# Patient Record
Sex: MALE | Race: WHITE | Employment: OTHER | URBAN - METROPOLITAN AREA
[De-identification: names, ages, dates, MRNs, and addresses within clinical notes are randomized per-mention and may not be internally consistent; named-entity substitution may affect disease eponyms.]

---

## 2019-06-17 ENCOUNTER — HOSPITAL ENCOUNTER (OUTPATIENT)
Dept: GENERAL RADIOLOGY | Age: 54
Discharge: HOME | End: 2019-06-17
Payer: COMMERCIAL

## 2019-06-17 DIAGNOSIS — M25.552 LEFT HIP PAIN: ICD-10-CM

## 2019-06-17 DIAGNOSIS — M25.551 RIGHT HIP PAIN: ICD-10-CM

## 2019-06-17 PROCEDURE — 73521 X-RAY EXAM HIPS BI 2 VIEWS: CPT

## 2020-07-14 ENCOUNTER — HOSPITAL ENCOUNTER (OUTPATIENT)
Dept: CT IMAGING | Age: 55
Discharge: HOME | End: 2020-07-14
Attending: INTERNAL MEDICINE
Payer: COMMERCIAL

## 2020-07-14 DIAGNOSIS — R14.0 ABDOMINAL BLOATING: ICD-10-CM

## 2020-07-14 PROCEDURE — 74177 CT ABD & PELVIS W/CONTRAST: CPT

## 2020-07-14 PROCEDURE — 74011636320 HC RX REV CODE- 636/320: Performed by: STUDENT IN AN ORGANIZED HEALTH CARE EDUCATION/TRAINING PROGRAM

## 2020-07-14 RX ADMIN — IOPAMIDOL 100 ML: 755 INJECTION, SOLUTION INTRAVENOUS at 17:46

## 2020-07-20 ENCOUNTER — HOSPITAL ENCOUNTER (EMERGENCY)
Age: 55
Discharge: HOME | End: 2020-07-20
Attending: EMERGENCY MEDICINE | Admitting: EMERGENCY MEDICINE
Payer: COMMERCIAL

## 2020-07-20 ENCOUNTER — APPOINTMENT (OUTPATIENT)
Dept: GENERAL RADIOLOGY | Age: 55
End: 2020-07-20
Attending: EMERGENCY MEDICINE
Payer: COMMERCIAL

## 2020-07-20 ENCOUNTER — APPOINTMENT (OUTPATIENT)
Dept: ULTRASOUND IMAGING | Age: 55
End: 2020-07-20
Attending: EMERGENCY MEDICINE
Payer: COMMERCIAL

## 2020-07-20 VITALS
HEART RATE: 64 BPM | OXYGEN SATURATION: 95 % | BODY MASS INDEX: 29.68 KG/M2 | HEIGHT: 71 IN | RESPIRATION RATE: 18 BRPM | DIASTOLIC BLOOD PRESSURE: 80 MMHG | SYSTOLIC BLOOD PRESSURE: 134 MMHG | TEMPERATURE: 98.6 F | WEIGHT: 212 LBS

## 2020-07-20 DIAGNOSIS — R06.02 SOB (SHORTNESS OF BREATH): ICD-10-CM

## 2020-07-20 DIAGNOSIS — K76.0 HEPATIC STEATOSIS: Primary | ICD-10-CM

## 2020-07-20 DIAGNOSIS — K80.20 CALCULUS OF GALLBLADDER WITHOUT CHOLECYSTITIS WITHOUT OBSTRUCTION: ICD-10-CM

## 2020-07-20 LAB
ALBUMIN SERPL-MCNC: 3.6 G/DL (ref 3.5–5)
ALBUMIN/GLOB SERPL: 1.1 {RATIO} (ref 1.1–2.2)
ALP SERPL-CCNC: 78 U/L (ref 45–117)
ALT SERPL-CCNC: 48 U/L (ref 12–78)
ANION GAP SERPL CALC-SCNC: 8 MMOL/L (ref 5–15)
AST SERPL-CCNC: 25 U/L (ref 15–37)
BASOPHILS # BLD: 0.1 K/UL (ref 0–0.1)
BASOPHILS NFR BLD: 1 % (ref 0–1)
BILIRUB SERPL-MCNC: 0.6 MG/DL (ref 0.2–1)
BUN SERPL-MCNC: 9 MG/DL (ref 6–20)
BUN/CREAT SERPL: 8 (ref 12–20)
CALCIUM SERPL-MCNC: 8.4 MG/DL (ref 8.5–10.1)
CHLORIDE SERPL-SCNC: 96 MMOL/L (ref 97–108)
CO2 SERPL-SCNC: 27 MMOL/L (ref 21–32)
COMMENT, HOLDF: NORMAL
CREAT SERPL-MCNC: 1.07 MG/DL (ref 0.7–1.3)
D DIMER PPP FEU-MCNC: 0.3 MG/L FEU (ref 0–0.65)
DIFFERENTIAL METHOD BLD: NORMAL
EOSINOPHIL # BLD: 0 K/UL (ref 0–0.4)
EOSINOPHIL NFR BLD: 0 % (ref 0–7)
ERYTHROCYTE [DISTWIDTH] IN BLOOD BY AUTOMATED COUNT: 13.9 % (ref 11.5–14.5)
GLOBULIN SER CALC-MCNC: 3.3 G/DL (ref 2–4)
GLUCOSE SERPL-MCNC: 102 MG/DL (ref 65–100)
HCT VFR BLD AUTO: 38.4 % (ref 36.6–50.3)
HGB BLD-MCNC: 13.2 G/DL (ref 12.1–17)
IMM GRANULOCYTES # BLD AUTO: 0 K/UL (ref 0–0.04)
IMM GRANULOCYTES NFR BLD AUTO: 0 % (ref 0–0.5)
LYMPHOCYTES # BLD: 2.1 K/UL (ref 0.8–3.5)
LYMPHOCYTES NFR BLD: 21 % (ref 12–49)
MCH RBC QN AUTO: 30.8 PG (ref 26–34)
MCHC RBC AUTO-ENTMCNC: 34.4 G/DL (ref 30–36.5)
MCV RBC AUTO: 89.5 FL (ref 80–99)
MONOCYTES # BLD: 0.6 K/UL (ref 0–1)
MONOCYTES NFR BLD: 6 % (ref 5–13)
NEUTS SEG # BLD: 6.9 K/UL (ref 1.8–8)
NEUTS SEG NFR BLD: 72 % (ref 32–75)
NRBC # BLD: 0 K/UL (ref 0–0.01)
NRBC BLD-RTO: 0 PER 100 WBC
PLATELET # BLD AUTO: 240 K/UL (ref 150–400)
PMV BLD AUTO: 9.7 FL (ref 8.9–12.9)
POTASSIUM SERPL-SCNC: 4.1 MMOL/L (ref 3.5–5.1)
PROT SERPL-MCNC: 6.9 G/DL (ref 6.4–8.2)
RBC # BLD AUTO: 4.29 M/UL (ref 4.1–5.7)
SAMPLES BEING HELD,HOLD: NORMAL
SODIUM SERPL-SCNC: 131 MMOL/L (ref 136–145)
TROPONIN I SERPL-MCNC: <0.05 NG/ML
WBC # BLD AUTO: 9.7 K/UL (ref 4.1–11.1)

## 2020-07-20 PROCEDURE — 93005 ELECTROCARDIOGRAM TRACING: CPT

## 2020-07-20 PROCEDURE — 76700 US EXAM ABDOM COMPLETE: CPT

## 2020-07-20 PROCEDURE — 74011250637 HC RX REV CODE- 250/637: Performed by: EMERGENCY MEDICINE

## 2020-07-20 PROCEDURE — 85025 COMPLETE CBC W/AUTO DIFF WBC: CPT

## 2020-07-20 PROCEDURE — 71046 X-RAY EXAM CHEST 2 VIEWS: CPT

## 2020-07-20 PROCEDURE — 74011000250 HC RX REV CODE- 250: Performed by: EMERGENCY MEDICINE

## 2020-07-20 PROCEDURE — 99284 EMERGENCY DEPT VISIT MOD MDM: CPT

## 2020-07-20 PROCEDURE — 85379 FIBRIN DEGRADATION QUANT: CPT

## 2020-07-20 PROCEDURE — 84484 ASSAY OF TROPONIN QUANT: CPT

## 2020-07-20 PROCEDURE — 80053 COMPREHEN METABOLIC PANEL: CPT

## 2020-07-20 RX ADMIN — LIDOCAINE HYDROCHLORIDE 40 ML: 20 SOLUTION ORAL; TOPICAL at 20:28

## 2020-07-20 NOTE — ED TRIAGE NOTES
Pt arrives via POV w/ c/c of CP & abdominal pain x5 weeks. Pt's abdomen appears distended & pt stated that it is causing him to be SOB.  Pt has hx of PE.

## 2020-07-20 NOTE — ED PROVIDER NOTES
Patient is a 15-year-old male with history of anxiety, depression and presenting to emergency department for evaluation of abdominal pain, chest tightness, shortness of breath with onset 5 weeks ago. Patient was seen by his primary care physician 2 weeks ago, and has had negative outpatient abdominal ultrasound and abdominal CTs, CT scan was last week. Pain is described as intermittent, with no known exacerbation or alleviation factors. He denies dizziness, fever, cough, nausea, vomiting, diarrhea, constipation, leg swelling, or any other medical at this time. Last bowel movement was this morning and was nonbloody and normal per patient. He reports history of pulmonary embolism of unknown cause in 2016, he is currently taking Eliquis.            Past Medical History:   Diagnosis Date    Anxiety disorder     Depression     Neuropathy of foot     Psychiatric disorder     panic disorder       Past Surgical History:   Procedure Laterality Date    HX OTHER SURGICAL      fatty tumor removed from shoulder blade    HX OTHER SURGICAL  2014    left arm surgery following accident    HX TUMOR REMOVAL      fatty tumor removed from Left shoulder x 2         Family History:   Problem Relation Age of Onset    Heart Attack Father        Social History     Socioeconomic History    Marital status:      Spouse name: Not on file    Number of children: Not on file    Years of education: Not on file    Highest education level: Not on file   Occupational History    Not on file   Social Needs    Financial resource strain: Not on file    Food insecurity     Worry: Not on file     Inability: Not on file    Transportation needs     Medical: Not on file     Non-medical: Not on file   Tobacco Use    Smoking status: Never Smoker    Smokeless tobacco: Current User     Types: Chew   Substance and Sexual Activity    Alcohol use: No     Alcohol/week: 0.0 standard drinks    Drug use: No    Sexual activity: Yes Partners: Female   Lifestyle    Physical activity     Days per week: Not on file     Minutes per session: Not on file    Stress: Not on file   Relationships    Social connections     Talks on phone: Not on file     Gets together: Not on file     Attends Restoration service: Not on file     Active member of club or organization: Not on file     Attends meetings of clubs or organizations: Not on file     Relationship status: Not on file    Intimate partner violence     Fear of current or ex partner: Not on file     Emotionally abused: Not on file     Physically abused: Not on file     Forced sexual activity: Not on file   Other Topics Concern    Not on file   Social History Narrative    ** Merged History Encounter **        48 year old   male admitted for complaints of severely depressed mood, with suicidal thoughts. Pt. Lives with his wife of 12 years and works as a  for [employer]. Pt.has some college. 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 He has been treated for the last 25 years on Xanax by his psychiatrist, Dr. Ventura Villalobos. Dione Burr. He has one prior inpatient admission at [facility], in the last year. Pt. Has been having breakthrough anxiety and \"panic attacks\" despite Xanax doses greater than 3 mg q day and the addition of propanolol. Pt. Hs not been treated with an SSRI. ALLERGIES: Sulfa (sulfonamide antibiotics)    Review of Systems   Constitutional: Negative for chills and fever. HENT: Negative for ear pain and sore throat. Eyes: Negative for visual disturbance. Respiratory: Positive for shortness of breath. Negative for cough. Cardiovascular: Positive for chest pain. Negative for palpitations and leg swelling. Gastrointestinal: Positive for abdominal pain. Negative for blood in stool, constipation, diarrhea, nausea and vomiting. Genitourinary: Negative for dysuria, flank pain and hematuria. Musculoskeletal: Negative for back pain.    Skin: Negative for color change. Neurological: Negative for dizziness and headaches. Psychiatric/Behavioral: Negative for confusion. All other systems reviewed and are negative. Vitals:    07/20/20 1910 07/20/20 1914   BP: 135/69    Pulse: 62    Resp: 25    Temp: 98.7 °F (37.1 °C)    SpO2: 98% 98%   Weight: 96.2 kg (212 lb)    Height: 5' 11\" (1.803 m)             Physical Exam  Vitals signs and nursing note reviewed. Constitutional:       General: He is not in acute distress. Appearance: Normal appearance. He is not ill-appearing. HENT:      Head: Normocephalic and atraumatic. Mouth/Throat:      Pharynx: Oropharynx is clear. Eyes:      Extraocular Movements: Extraocular movements intact. Conjunctiva/sclera: Conjunctivae normal.   Neck:      Musculoskeletal: No neck rigidity. Cardiovascular:      Rate and Rhythm: Normal rate and regular rhythm. Heart sounds: Normal heart sounds. Pulmonary:      Effort: Pulmonary effort is normal.      Breath sounds: Normal breath sounds. No decreased breath sounds, wheezing, rhonchi or rales. Chest:      Chest wall: No tenderness or edema. Abdominal:      Palpations: Abdomen is soft. Tenderness: There is generalized abdominal tenderness. There is no right CVA tenderness, left CVA tenderness or guarding. Negative signs include Taylor's sign, McBurney's sign and psoas sign. Musculoskeletal: Normal range of motion. Right lower leg: He exhibits no tenderness. No edema. Left lower leg: He exhibits no tenderness. No edema. Skin:     General: Skin is warm and dry. Neurological:      General: No focal deficit present. Mental Status: He is alert and oriented to person, place, and time. Psychiatric:         Mood and Affect: Mood normal.          MDM  Number of Diagnoses or Management Options  Diagnosis management comments: Patient is alert, well-appearing, afebrile, vital stable.   5 with history of ongoing abdominal pain, has had negative outpatient ultrasounds and a negative outpatient abdominal CT on July 14. Pain has been unchanged since imaging. He has been seen by his primary care physician for this. Abdomen seems mildly distended, is soft, with generalized tenderness without guarding in all 4 quadrants. Negative Taylor sign, negative McBurney's point. He also complains of chest pressure and intermittent shortness of breath, lungs clear to auscultation bilaterally. He had a pulmonary embolism several years ago and is currently taking Eliquis. Plan: EKG, CBC, CMP, troponin, d-dimer, ultrasound abdomen, chest x-ray, monitor, reassess       Amount and/or Complexity of Data Reviewed  Clinical lab tests: reviewed  Tests in the radiology section of CPT®: reviewed  Tests in the medicine section of CPT®: reviewed  Discuss the patient with other providers: yes (Dr. Tina Lindsay )      ED Course as of Jul 20 2047   Mon Jul 20, 2020 2008 ED EKG interpretation:8:09 PM  Rhythm: Sinus bradycardia and regular. Rate (approx.): 59; Axis: normal; P wave: normal; ST/T wave: no concerning ST elevations or depressions; Other findings: unremarkable. Leopoldo Deans, PA        [EH]   2035 TROPONIN I:    Troponin-I, Qt. <0.05 []   2035 CBC WITH AUTOMATED DIFF:    WBC 9.7   RBC 4.29   HGB 13.2   HCT 38.4   MCV 89.5   MCH 30.8   MCHC 34.4   RDW 13.9   PLATELET 940   MPV 9.7   NRBC 0.0   ABSOLUTE NRBC 0.00   NEUTROPHILS 72   LYMPHOCYTES 21   MONOCYTES 6   EOSINOPHILS 0   BASOPHILS 1   IMMATURE GRANULOCYTES 0   ABS. NEUTROPHILS 6.9   ABS. LYMPHOCYTES 2.1   ABS. MONOCYTES 0.6   ABS. EOSINOPHILS 0.0   ABS. BASOPHILS 0.1   ABS. IMM. GRANS. 0.0   DF AUTOMATED [EH]   7636 METABOLIC PANEL, COMPREHENSIVE(!):    Sodium 131(!)   Potassium 4.1   Chloride 96(!)   CO2 27   Anion gap 8   Glucose 102(!)   BUN 9   Creatinine 1.07   BUN/Creatinine ratio 8(!)   GFR est AA >60   GFR est non-AA >60   Calcium 8.4(!)   Bilirubin, total 0.6   ALT 48   AST 25   Alk. phosphatase 78   Protein, total 6.9   Albumin 3.6   Globulin 3.3   A-G Ratio 1.1 []   2035 IMPRESSION:   1. Vague heterogeneous opacities scattered in both lungs, suspicious for  multifocal pneumonia. XR CHEST PA AND LATERAL []      ED Course User Index  [EH] NAE Mota     9:17 PM  Chest x-ray with concerning features for possible mild viral pneumonia. I personally ambulated patient without signs of desaturation. Discussed with patient chest x-ray findings and he notes that he had a fever and cough several months ago, but denies any current symptoms. Will not move forward with COVID-19 test at this time as he does not show any signs of symptomatic infection. EKG without acute ischemic changes, troponin x1-, heart score low risk category. D-dimer within normal limits. Abdominal ultrasound shows hepatic steatosis, splenomegaly, and gallstones without evidence of cholecystitis. Remainder of ED work-up within normal limits. Pt has been reevaluated. There are no new complaints, changes, or physical findings at this time. Medications have been reviewed w/ pt and/or family. Pt and/or family's questions have been answered. Pt and/or family expressed good understanding of the dx/tx/rx and is in agreement with plan of care. Pt instructed and agreed to f/u w/ PCP and to return to ED upon further deterioration. Pt is ready for discharge. Discussed patient with attending ED MD Angelina Lopez MD who agrees with current management plan. IMPRESSION:  1. Hepatic steatosis    2. SOB (shortness of breath)    3. Calculus of gallbladder without cholecystitis without obstruction        PLAN:  1. Current Discharge Medication List        2.    Follow-up Information    None           Return to ED if worse           Procedures

## 2020-07-20 NOTE — ED NOTES
Addressed patient using AIDET. Patient updated on plan of care, and given opportunity to ask questions. Patient verbalizes understanding. Patient is currently resting, no needs expressed at this time. Bed is in the low position, wheels are locked, and call bell is within reach. Will continue to monitor and assess.

## 2020-07-21 LAB
ATRIAL RATE: 59 BPM
CALCULATED P AXIS, ECG09: 17 DEGREES
CALCULATED R AXIS, ECG10: -4 DEGREES
CALCULATED T AXIS, ECG11: 46 DEGREES
DIAGNOSIS, 93000: NORMAL
P-R INTERVAL, ECG05: 196 MS
Q-T INTERVAL, ECG07: 426 MS
QRS DURATION, ECG06: 84 MS
QTC CALCULATION (BEZET), ECG08: 421 MS
VENTRICULAR RATE, ECG03: 59 BPM

## 2020-07-28 ENCOUNTER — HOSPITAL ENCOUNTER (EMERGENCY)
Age: 55
Discharge: HOME | End: 2020-07-28
Attending: EMERGENCY MEDICINE
Payer: COMMERCIAL

## 2020-07-28 ENCOUNTER — APPOINTMENT (OUTPATIENT)
Dept: GENERAL RADIOLOGY | Age: 55
End: 2020-07-28
Attending: EMERGENCY MEDICINE
Payer: COMMERCIAL

## 2020-07-28 VITALS
SYSTOLIC BLOOD PRESSURE: 149 MMHG | BODY MASS INDEX: 29.68 KG/M2 | HEART RATE: 59 BPM | RESPIRATION RATE: 18 BRPM | TEMPERATURE: 97.9 F | OXYGEN SATURATION: 97 % | WEIGHT: 212 LBS | HEIGHT: 71 IN | DIASTOLIC BLOOD PRESSURE: 92 MMHG

## 2020-07-28 DIAGNOSIS — R10.84 ABDOMINAL PAIN, GENERALIZED: Primary | ICD-10-CM

## 2020-07-28 LAB
ALBUMIN SERPL-MCNC: 3.6 G/DL (ref 3.5–5)
ALBUMIN/GLOB SERPL: 1 {RATIO} (ref 1.1–2.2)
ALP SERPL-CCNC: 80 U/L (ref 45–117)
ALT SERPL-CCNC: 48 U/L (ref 12–78)
ANION GAP SERPL CALC-SCNC: 7 MMOL/L (ref 5–15)
AST SERPL-CCNC: 27 U/L (ref 15–37)
BASOPHILS # BLD: 0.1 K/UL (ref 0–0.1)
BASOPHILS NFR BLD: 1 % (ref 0–1)
BILIRUB SERPL-MCNC: 0.4 MG/DL (ref 0.2–1)
BUN SERPL-MCNC: 6 MG/DL (ref 6–20)
BUN/CREAT SERPL: 6 (ref 12–20)
CALCIUM SERPL-MCNC: 8.4 MG/DL (ref 8.5–10.1)
CHLORIDE SERPL-SCNC: 101 MMOL/L (ref 97–108)
CO2 SERPL-SCNC: 25 MMOL/L (ref 21–32)
COMMENT, HOLDF: NORMAL
CREAT SERPL-MCNC: 1.09 MG/DL (ref 0.7–1.3)
D DIMER PPP FEU-MCNC: 0.36 MG/L FEU (ref 0–0.65)
DIFFERENTIAL METHOD BLD: NORMAL
EOSINOPHIL # BLD: 0.1 K/UL (ref 0–0.4)
EOSINOPHIL NFR BLD: 1 % (ref 0–7)
ERYTHROCYTE [DISTWIDTH] IN BLOOD BY AUTOMATED COUNT: 13.9 % (ref 11.5–14.5)
GLOBULIN SER CALC-MCNC: 3.6 G/DL (ref 2–4)
GLUCOSE SERPL-MCNC: 107 MG/DL (ref 65–100)
HCT VFR BLD AUTO: 39.5 % (ref 36.6–50.3)
HGB BLD-MCNC: 13.8 G/DL (ref 12.1–17)
IMM GRANULOCYTES # BLD AUTO: 0 K/UL (ref 0–0.04)
IMM GRANULOCYTES NFR BLD AUTO: 0 % (ref 0–0.5)
LIPASE SERPL-CCNC: 151 U/L (ref 73–393)
LYMPHOCYTES # BLD: 2.4 K/UL (ref 0.8–3.5)
LYMPHOCYTES NFR BLD: 25 % (ref 12–49)
MCH RBC QN AUTO: 31.3 PG (ref 26–34)
MCHC RBC AUTO-ENTMCNC: 34.9 G/DL (ref 30–36.5)
MCV RBC AUTO: 89.6 FL (ref 80–99)
MONOCYTES # BLD: 0.6 K/UL (ref 0–1)
MONOCYTES NFR BLD: 6 % (ref 5–13)
NEUTS SEG # BLD: 6.7 K/UL (ref 1.8–8)
NEUTS SEG NFR BLD: 67 % (ref 32–75)
NRBC # BLD: 0 K/UL (ref 0–0.01)
NRBC BLD-RTO: 0 PER 100 WBC
PLATELET # BLD AUTO: 234 K/UL (ref 150–400)
PMV BLD AUTO: 9.6 FL (ref 8.9–12.9)
POTASSIUM SERPL-SCNC: 3.9 MMOL/L (ref 3.5–5.1)
PROT SERPL-MCNC: 7.2 G/DL (ref 6.4–8.2)
RBC # BLD AUTO: 4.41 M/UL (ref 4.1–5.7)
SAMPLES BEING HELD,HOLD: NORMAL
SODIUM SERPL-SCNC: 133 MMOL/L (ref 136–145)
TROPONIN I SERPL-MCNC: <0.05 NG/ML
WBC # BLD AUTO: 9.9 K/UL (ref 4.1–11.1)

## 2020-07-28 PROCEDURE — 93005 ELECTROCARDIOGRAM TRACING: CPT

## 2020-07-28 PROCEDURE — 99283 EMERGENCY DEPT VISIT LOW MDM: CPT

## 2020-07-28 PROCEDURE — 84484 ASSAY OF TROPONIN QUANT: CPT

## 2020-07-28 PROCEDURE — 85379 FIBRIN DEGRADATION QUANT: CPT

## 2020-07-28 PROCEDURE — 80053 COMPREHEN METABOLIC PANEL: CPT

## 2020-07-28 PROCEDURE — 83690 ASSAY OF LIPASE: CPT

## 2020-07-28 PROCEDURE — 85025 COMPLETE CBC W/AUTO DIFF WBC: CPT

## 2020-07-28 PROCEDURE — 71046 X-RAY EXAM CHEST 2 VIEWS: CPT

## 2020-07-28 PROCEDURE — 36415 COLL VENOUS BLD VENIPUNCTURE: CPT

## 2020-07-28 NOTE — ED PROVIDER NOTES
Tito Portillo is a 60-year-old male presenting today with generalized abdominal pain. Starts in the epigastric/right upper quadrant then spreads across his entire abdomen. Has had this chronically for the past couple weeks, with intermittent flareups. Today's flareup started this morning, has currently resolved. Does not have any abdominal pain currently. Followed by both primary care physician and GI for this, last CT on July 14, 2020 showed no  acute processes. Last abdominal ultrasound on July 20, 2020 showed gallstones. GI has seen and evaluated patient, placed him on dicyclomine. Patient has a history of PE's, previous PE had similar pain. No leg swelling, recent trauma or surgery, cough, hemoptysis, shortness of breath. Patient is currently on Eliquis. Past medical history significant for anxiety, depression, neuropathy, panic disorder.            Past Medical History:   Diagnosis Date    Anxiety disorder     Depression     Neuropathy of foot     Psychiatric disorder     panic disorder       Past Surgical History:   Procedure Laterality Date    HX OTHER SURGICAL      fatty tumor removed from shoulder blade    HX OTHER SURGICAL  2014    left arm surgery following accident    HX TUMOR REMOVAL      fatty tumor removed from Left shoulder x 2         Family History:   Problem Relation Age of Onset    Heart Attack Father        Social History     Socioeconomic History    Marital status:      Spouse name: Not on file    Number of children: Not on file    Years of education: Not on file    Highest education level: Not on file   Occupational History    Not on file   Social Needs    Financial resource strain: Not on file    Food insecurity     Worry: Not on file     Inability: Not on file    Transportation needs     Medical: Not on file     Non-medical: Not on file   Tobacco Use    Smoking status: Never Smoker    Smokeless tobacco: Current User     Types: Chew   Substance and Sexual Activity    Alcohol use: No     Alcohol/week: 0.0 standard drinks    Drug use: No    Sexual activity: Yes     Partners: Female   Lifestyle    Physical activity     Days per week: Not on file     Minutes per session: Not on file    Stress: Not on file   Relationships    Social connections     Talks on phone: Not on file     Gets together: Not on file     Attends Denominational service: Not on file     Active member of club or organization: Not on file     Attends meetings of clubs or organizations: Not on file     Relationship status: Not on file    Intimate partner violence     Fear of current or ex partner: Not on file     Emotionally abused: Not on file     Physically abused: Not on file     Forced sexual activity: Not on file   Other Topics Concern    Not on file   Social History Narrative    ** Merged History Encounter **        48 year old   male admitted for complaints of severely depressed mood, with suicidal thoughts. Pt. Lives with his wife of 12 years and works as a  for [employer]. Pt.has some college. 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 He has been treated for the last 25 years on Xanax by his psychiatrist, Dr. Drake Harris. Rashaun Santizo. He has one prior inpatient admission at [facility], in the last year. Pt. Has been having breakthrough anxiety and \"panic attacks\" despite Xanax doses greater than 3 mg q day and the addition of propanolol. Pt. Hs not been treated with an SSRI. ALLERGIES: Sulfa (sulfonamide antibiotics)    Review of Systems   Constitutional: Negative for chills and fever. HENT: Negative for congestion and sore throat. Eyes: Negative for pain. Respiratory: Negative for cough and shortness of breath. Cardiovascular: Negative for chest pain and palpitations. Gastrointestinal: Negative for abdominal pain, diarrhea, nausea and vomiting. Genitourinary: Negative for dysuria, frequency and urgency.    Musculoskeletal: Negative for back pain and neck pain. Skin: Negative for rash and wound. Neurological: Negative for dizziness, light-headedness and headaches. Vitals:    07/28/20 1655   BP: (!) 157/98   Pulse: 70   Resp: 20   Temp: 99.4 °F (37.4 °C)   SpO2: 96%   Weight: 96.2 kg (212 lb)   Height: 5' 11\" (1.803 m)            Physical Exam  Vitals signs and nursing note reviewed. Constitutional:       Appearance: Normal appearance. HENT:      Head: Normocephalic and atraumatic. Nose: Nose normal.   Eyes:      Extraocular Movements: Extraocular movements intact. Conjunctiva/sclera: Conjunctivae normal.      Pupils: Pupils are equal, round, and reactive to light. Neck:      Musculoskeletal: Normal range of motion and neck supple. Cardiovascular:      Rate and Rhythm: Normal rate and regular rhythm. Pulses: Normal pulses. Radial pulses are 2+ on the right side and 2+ on the left side. Posterior tibial pulses are 2+ on the right side and 2+ on the left side. Heart sounds: Normal heart sounds. Pulmonary:      Effort: Pulmonary effort is normal.      Breath sounds: Normal breath sounds. Abdominal:      General: Abdomen is flat. Bowel sounds are normal.      Palpations: Abdomen is soft. Tenderness: There is no abdominal tenderness. There is no right CVA tenderness, left CVA tenderness, guarding or rebound. Negative signs include Taylor's sign. Musculoskeletal: Normal range of motion. Skin:     General: Skin is warm and dry. Neurological:      General: No focal deficit present. Mental Status: He is alert and oriented to person, place, and time. Mental status is at baseline. Psychiatric:         Mood and Affect: Mood normal.         Behavior: Behavior normal.         Thought Content:  Thought content normal.          MDM  Number of Diagnoses or Management Options  Abdominal pain, generalized:   Diagnosis management comments: LABS:  Recent Results (from the past 6 hour(s))  -CBC WITH AUTOMATED DIFF  Collection Time: 07/28/20  5:29 PM       Result                      Value             Ref Range           WBC                         9.9               4.1 - 11.1 K*       RBC                         4.41              4.10 - 5.70 *       HGB                         13.8              12.1 - 17.0 *       HCT                         39.5              36.6 - 50.3 %       MCV                         89.6              80.0 - 99.0 *       MCH                         31.3              26.0 - 34.0 *       MCHC                        34.9              30.0 - 36.5 *       RDW                         13.9              11.5 - 14.5 %       PLATELET                    234               150 - 400 K/*       MPV                         9.6               8.9 - 12.9 FL       NRBC                        0.0               0  WBC       ABSOLUTE NRBC               0.00              0.00 - 0.01 *       NEUTROPHILS                 67                32 - 75 %           LYMPHOCYTES                 25                12 - 49 %           MONOCYTES                   6                 5 - 13 %            EOSINOPHILS                 1                 0 - 7 %             BASOPHILS                   1                 0 - 1 %             IMMATURE GRANULOCYTES       0                 0.0 - 0.5 %         ABS. NEUTROPHILS            6.7               1.8 - 8.0 K/*       ABS. LYMPHOCYTES            2.4               0.8 - 3.5 K/*       ABS. MONOCYTES              0.6               0.0 - 1.0 K/*       ABS. EOSINOPHILS            0.1               0.0 - 0.4 K/*       ABS. BASOPHILS              0.1               0.0 - 0.1 K/*       ABS. IMM.  GRANS.            0.0               0.00 - 0.04 *       DF                          AUTOMATED                        -METABOLIC PANEL, COMPREHENSIVE  Collection Time: 07/28/20  5:29 PM       Result                      Value             Ref Range           Sodium                      133 (L) 136 - 145 mm*       Potassium                   3.9               3.5 - 5.1 mm*       Chloride                    101               97 - 108 mmo*       CO2                         25                21 - 32 mmol*       Anion gap                   7                 5 - 15 mmol/L       Glucose                     107 (H)           65 - 100 mg/*       BUN                         6                 6 - 20 MG/DL        Creatinine                  1.09              0.70 - 1.30 *       BUN/Creatinine ratio        6 (L)             12 - 20             GFR est AA                  >60               >60 ml/min/1*       GFR est non-AA              >60               >60 ml/min/1*       Calcium                     8.4 (L)           8.5 - 10.1 M*       Bilirubin, total            0.4               0.2 - 1.0 MG*       ALT (SGPT)                  48                12 - 78 U/L         AST (SGOT)                  27                15 - 37 U/L         Alk.  phosphatase            80                45 - 117 U/L        Protein, total              7.2               6.4 - 8.2 g/*       Albumin                     3.6               3.5 - 5.0 g/*       Globulin                    3.6               2.0 - 4.0 g/*       A-G Ratio                   1.0 (L)           1.1 - 2.2      -LIPASE  Collection Time: 07/28/20  5:29 PM       Result                      Value             Ref Range           Lipase                      151               73 - 393 U/L   -TROPONIN I  Collection Time: 07/28/20  5:29 PM       Result                      Value             Ref Range           Troponin-I, Qt.             <0.05             <0.05 ng/mL    -D DIMER  Collection Time: 07/28/20  5:29 PM       Result                      Value             Ref Range           D-dimer                     0.36              0.00 - 0.65 *  -SAMPLES BEING HELD  Collection Time: 07/28/20  5:29 PM       Result                      Value             Ref Range           SAMPLES BEING HELD          SST. RED                               COMMENT                                                       Add-on orders for these samples will be processed based on acceptable specimen integrity and analyte stability, which may vary by analyte. IMAGING:  XR CHEST PA LAT   Final Result    IMPRESSION:    1. No radiographic evidence of acute cardiopulmonary disease. Medications During Visit:  Medications - No data to display      DECISION MAKING:  Alie Leonard is a 48 y.o. male who comes in as above. Acute on chronic exacerbation of right upper quadrant/epigastric abdominal pain that radiates across the entire abdomen. Followed by both GI and PCP for this with no clear etiology. Placed on dicyclomine by GI. Last CT completed on July 14, 2020, last ultrasound completed on July 20, 2020. Patient states this is the same exact pain as previous episodes. Vitals stable. Patient resting comfortably and without acute distress, abdominal exam benign, no tenderness, no rebound, no guarding. Discussed would not repeat CT/ultrasound due to radiation/no change in pain/no tenderness today. Patient agreeable. Follow-up with GI and PCP within 1 week. Return to ED if any worsening or severe symptoms.       IMPRESSION:  Abdominal pain, generalized  (primary encounter diagnosis)    DISPOSITION:  Discharged      Current Discharge Medication List         Follow-up Information     Follow up With Specialties Details Why Contact Info    Antoni Sanchez MD Family Medicine Schedule an appointment as soon as   possible for a visit in 1 week  [address] [facility]      Oskar Tran MD Family Medicine, Gastroenterology Schedule an   appointment as soon as possible for a visit in 1 week  [address]  [address] [facility] [address]  [phone]      [address] [place] [address] DEPT Emergency Medicine Go to  If symptoms worsen [address] [facility] [id number] [phone]          The patient is asked to follow-up with their primary care provider in the next several days. They are to call tomorrow for an appointment. The patient is asked to return promptly for any increased concerns or worsening of symptoms. They can return to this emergency department or any other emergency department.            Procedures

## 2020-07-28 NOTE — ED TRIAGE NOTES
Pt reports generalized abdominal pain onset about 5-6 weeks ago radiating to his back. Also reports pleuritic chest pain. Pt states he has been seen in the ED and by his PCP for his sx with no diagnosis and his sx have not improved. Pt reports hx of PE with similar pain. Denies any pain or swelling in his legs. Takes Eliquis. Denies nausea, vomiting, or diarrhea.

## 2020-07-28 NOTE — ED NOTES

## 2020-07-29 LAB
ATRIAL RATE: 69 BPM
CALCULATED P AXIS, ECG09: 29 DEGREES
CALCULATED R AXIS, ECG10: 8 DEGREES
CALCULATED T AXIS, ECG11: 50 DEGREES
DIAGNOSIS, 93000: NORMAL
P-R INTERVAL, ECG05: 192 MS
Q-T INTERVAL, ECG07: 398 MS
QRS DURATION, ECG06: 90 MS
QTC CALCULATION (BEZET), ECG08: 426 MS
VENTRICULAR RATE, ECG03: 69 BPM

## 2020-08-02 ENCOUNTER — APPOINTMENT (OUTPATIENT)
Dept: CT IMAGING | Age: 55
End: 2020-08-02
Attending: EMERGENCY MEDICINE
Payer: COMMERCIAL

## 2020-08-02 ENCOUNTER — HOSPITAL ENCOUNTER (EMERGENCY)
Age: 55
Discharge: HOME | End: 2020-08-02
Attending: EMERGENCY MEDICINE
Payer: COMMERCIAL

## 2020-08-02 VITALS
HEIGHT: 71 IN | HEART RATE: 76 BPM | BODY MASS INDEX: 30.1 KG/M2 | TEMPERATURE: 98.4 F | OXYGEN SATURATION: 94 % | SYSTOLIC BLOOD PRESSURE: 139 MMHG | DIASTOLIC BLOOD PRESSURE: 61 MMHG | RESPIRATION RATE: 18 BRPM | WEIGHT: 215 LBS

## 2020-08-02 DIAGNOSIS — R10.84 ABDOMINAL PAIN, GENERALIZED: Primary | ICD-10-CM

## 2020-08-02 LAB
ALBUMIN SERPL-MCNC: 3.6 G/DL (ref 3.5–5)
ALBUMIN/GLOB SERPL: 1 {RATIO} (ref 1.1–2.2)
ALP SERPL-CCNC: 86 U/L (ref 45–117)
ALT SERPL-CCNC: 46 U/L (ref 12–78)
ANION GAP SERPL CALC-SCNC: 5 MMOL/L (ref 5–15)
AST SERPL-CCNC: 28 U/L (ref 15–37)
BASOPHILS # BLD: 0.1 K/UL (ref 0–0.1)
BASOPHILS NFR BLD: 1 % (ref 0–1)
BILIRUB SERPL-MCNC: 0.5 MG/DL (ref 0.2–1)
BUN SERPL-MCNC: 9 MG/DL (ref 6–20)
BUN/CREAT SERPL: 8 (ref 12–20)
CALCIUM SERPL-MCNC: 8.5 MG/DL (ref 8.5–10.1)
CHLORIDE SERPL-SCNC: 101 MMOL/L (ref 97–108)
CO2 SERPL-SCNC: 30 MMOL/L (ref 21–32)
COMMENT, HOLDF: NORMAL
CREAT SERPL-MCNC: 1.19 MG/DL (ref 0.7–1.3)
DIFFERENTIAL METHOD BLD: NORMAL
EOSINOPHIL # BLD: 0.1 K/UL (ref 0–0.4)
EOSINOPHIL NFR BLD: 1 % (ref 0–7)
ERYTHROCYTE [DISTWIDTH] IN BLOOD BY AUTOMATED COUNT: 14 % (ref 11.5–14.5)
GLOBULIN SER CALC-MCNC: 3.5 G/DL (ref 2–4)
GLUCOSE SERPL-MCNC: 129 MG/DL (ref 65–100)
HCT VFR BLD AUTO: 41.1 % (ref 36.6–50.3)
HGB BLD-MCNC: 13.7 G/DL (ref 12.1–17)
IMM GRANULOCYTES # BLD AUTO: 0 K/UL (ref 0–0.04)
IMM GRANULOCYTES NFR BLD AUTO: 0 % (ref 0–0.5)
LACTATE BLD-SCNC: 1.76 MMOL/L (ref 0.4–2)
LIPASE SERPL-CCNC: 178 U/L (ref 73–393)
LYMPHOCYTES # BLD: 2.7 K/UL (ref 0.8–3.5)
LYMPHOCYTES NFR BLD: 28 % (ref 12–49)
MCH RBC QN AUTO: 30.5 PG (ref 26–34)
MCHC RBC AUTO-ENTMCNC: 33.3 G/DL (ref 30–36.5)
MCV RBC AUTO: 91.5 FL (ref 80–99)
MONOCYTES # BLD: 0.7 K/UL (ref 0–1)
MONOCYTES NFR BLD: 7 % (ref 5–13)
NEUTS SEG # BLD: 5.9 K/UL (ref 1.8–8)
NEUTS SEG NFR BLD: 63 % (ref 32–75)
NRBC # BLD: 0 K/UL (ref 0–0.01)
NRBC BLD-RTO: 0 PER 100 WBC
PLATELET # BLD AUTO: 234 K/UL (ref 150–400)
PMV BLD AUTO: 9.7 FL (ref 8.9–12.9)
POTASSIUM SERPL-SCNC: 3.8 MMOL/L (ref 3.5–5.1)
PROT SERPL-MCNC: 7.1 G/DL (ref 6.4–8.2)
RBC # BLD AUTO: 4.49 M/UL (ref 4.1–5.7)
RBC MORPH BLD: NORMAL
SAMPLES BEING HELD,HOLD: NORMAL
SODIUM SERPL-SCNC: 136 MMOL/L (ref 136–145)
WBC # BLD AUTO: 9.5 K/UL (ref 4.1–11.1)

## 2020-08-02 PROCEDURE — 74011250636 HC RX REV CODE- 250/636: Performed by: EMERGENCY MEDICINE

## 2020-08-02 PROCEDURE — 74176 CT ABD & PELVIS W/O CONTRAST: CPT

## 2020-08-02 PROCEDURE — 99284 EMERGENCY DEPT VISIT MOD MDM: CPT

## 2020-08-02 PROCEDURE — 83605 ASSAY OF LACTIC ACID: CPT

## 2020-08-02 PROCEDURE — 85025 COMPLETE CBC W/AUTO DIFF WBC: CPT

## 2020-08-02 PROCEDURE — 96374 THER/PROPH/DIAG INJ IV PUSH: CPT

## 2020-08-02 PROCEDURE — 96375 TX/PRO/DX INJ NEW DRUG ADDON: CPT

## 2020-08-02 PROCEDURE — 36415 COLL VENOUS BLD VENIPUNCTURE: CPT

## 2020-08-02 PROCEDURE — 83690 ASSAY OF LIPASE: CPT

## 2020-08-02 PROCEDURE — 80053 COMPREHEN METABOLIC PANEL: CPT

## 2020-08-02 RX ORDER — MORPHINE SULFATE 4 MG/ML
4 INJECTION INTRAVENOUS
Status: COMPLETED | OUTPATIENT
Start: 2020-08-02 | End: 2020-08-02

## 2020-08-02 RX ORDER — OXYCODONE AND ACETAMINOPHEN 5; 325 MG/1; MG/1
1 TABLET ORAL
Qty: 12 TAB | Refills: 0 | Status: SHIPPED | OUTPATIENT
Start: 2020-08-02 | End: 2020-08-05

## 2020-08-02 RX ORDER — SUCRALFATE 1 G/10ML
1 SUSPENSION ORAL 4 TIMES DAILY
Qty: 200 ML | Refills: 0 | Status: SHIPPED | OUTPATIENT
Start: 2020-08-02 | End: 2020-08-07

## 2020-08-02 RX ORDER — ACETAMINOPHEN 500 MG
1000 TABLET ORAL 3 TIMES DAILY
Qty: 24 TAB | Refills: 0 | Status: SHIPPED | OUTPATIENT
Start: 2020-08-02 | End: 2020-08-02 | Stop reason: CLARIF

## 2020-08-02 RX ORDER — ALUMINA, MAGNESIA, AND SIMETHICONE 2400; 2400; 240 MG/30ML; MG/30ML; MG/30ML
10 SUSPENSION ORAL
Qty: 150 ML | Refills: 0 | Status: SHIPPED | OUTPATIENT
Start: 2020-08-02 | End: 2020-08-02 | Stop reason: CLARIF

## 2020-08-02 RX ORDER — HYDROMORPHONE HYDROCHLORIDE 1 MG/ML
1 INJECTION, SOLUTION INTRAMUSCULAR; INTRAVENOUS; SUBCUTANEOUS ONCE
Status: COMPLETED | OUTPATIENT
Start: 2020-08-02 | End: 2020-08-02

## 2020-08-02 RX ORDER — ONDANSETRON 2 MG/ML
4 INJECTION INTRAMUSCULAR; INTRAVENOUS
Status: DISCONTINUED | OUTPATIENT
Start: 2020-08-02 | End: 2020-08-02 | Stop reason: HOSPADM

## 2020-08-02 RX ADMIN — SODIUM CHLORIDE 1000 ML: 900 INJECTION, SOLUTION INTRAVENOUS at 03:20

## 2020-08-02 RX ADMIN — HYDROMORPHONE HYDROCHLORIDE 1 MG: 1 INJECTION, SOLUTION INTRAMUSCULAR; INTRAVENOUS; SUBCUTANEOUS at 04:38

## 2020-08-02 RX ADMIN — MORPHINE SULFATE 4 MG: 4 INJECTION INTRAVENOUS at 03:19

## 2020-08-02 RX ADMIN — ONDANSETRON 4 MG: 2 INJECTION INTRAMUSCULAR; INTRAVENOUS at 03:19

## 2020-08-02 NOTE — ED TRIAGE NOTES
Abdominal pain starting \"months ago\", worsening tonight. Was seen here Tuesday night. Has not been able to work since Tuesday. Guarding in triage, pacing around room.

## 2020-08-02 NOTE — ED PROVIDER NOTES
64-year-old male presenting ER with complaint of abdominal pain. Patient has been seen in the ER several times in the last week for the same symptoms. Patient reports associated nausea but no vomiting. Patient had CAT scan abdomen pelvis with IV contrast on July 8 with no acute findings, ultrasound July 20 showing fatty liver and gallstones with no signs of cholecystitis patient was also seen at [facility] facility within the week and had a CT of the chest.  Patient has history of PEs and currently on anticoagulation Eliquis. Patient's Eliquis was changed from Xarelto. Patient reports speaking with his GI doctor who started him on Bentyl however he is not taking this medication. Patient also takes Protonix. Patient denies any constipation or diarrhea. No dysuria. Abdominal Pain    Pertinent negatives include no fever, no diarrhea, no nausea, no vomiting, no dysuria, no headaches, no chest pain and no back pain.         Past Medical History:   Diagnosis Date    Anxiety disorder     Depression     Neuropathy of foot     Psychiatric disorder     panic disorder       Past Surgical History:   Procedure Laterality Date    HX OTHER SURGICAL      fatty tumor removed from shoulder blade    HX OTHER SURGICAL  2014    left arm surgery following accident    HX TUMOR REMOVAL      fatty tumor removed from Left shoulder x 2         Family History:   Problem Relation Age of Onset    Heart Attack Father        Social History     Socioeconomic History    Marital status:      Spouse name: Not on file    Number of children: Not on file    Years of education: Not on file    Highest education level: Not on file   Occupational History    Not on file   Social Needs    Financial resource strain: Not on file    Food insecurity     Worry: Not on file     Inability: Not on file    Transportation needs     Medical: Not on file     Non-medical: Not on file   Tobacco Use    Smoking status: Never Smoker    Smokeless tobacco: Current User     Types: Chew   Substance and Sexual Activity    Alcohol use: No     Alcohol/week: 0.0 standard drinks    Drug use: No    Sexual activity: Yes     Partners: Female   Lifestyle    Physical activity     Days per week: Not on file     Minutes per session: Not on file    Stress: Not on file   Relationships    Social connections     Talks on phone: Not on file     Gets together: Not on file     Attends Quaker service: Not on file     Active member of club or organization: Not on file     Attends meetings of clubs or organizations: Not on file     Relationship status: Not on file    Intimate partner violence     Fear of current or ex partner: Not on file     Emotionally abused: Not on file     Physically abused: Not on file     Forced sexual activity: Not on file   Other Topics Concern    Not on file   Social History Narrative    ** Merged History Encounter **        48 year old   male admitted for complaints of severely depressed mood, with suicidal thoughts. Pt. Lives with his wife of 12 years and works as a  for [employer]. Pt.has some college. 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 He has been treated for the last 25 years on Xanax by his psychiatrist, Dr. Cecily Almanza. Anabela Felipe. He has one prior inpatient admission at [facility], in the last year. Pt. Has been having breakthrough anxiety and \"panic attacks\" despite Xanax doses greater than 3 mg q day and the addition of propanolol. Pt. Hs not been treated with an SSRI. ALLERGIES: Sulfa (sulfonamide antibiotics)    Review of Systems   Constitutional: Negative for chills and fever. HENT: Negative for congestion and sore throat. Eyes: Negative for pain. Respiratory: Negative for shortness of breath. Cardiovascular: Negative for chest pain. Gastrointestinal: Positive for abdominal pain. Negative for diarrhea, nausea and vomiting. Genitourinary: Negative for dysuria and flank pain. Musculoskeletal: Negative for back pain and neck pain. Skin: Negative for rash. Neurological: Negative for dizziness and headaches. All other systems reviewed and are negative. Vitals:    08/02/20 0223   BP: 135/79   Pulse: 70   Resp: 18   Temp: 98.4 °F (36.9 °C)   SpO2: 99%   Weight: 97.5 kg (215 lb)   Height: 5' 11\" (1.803 m)            Physical Exam  Constitutional:       General: He is in acute distress. Appearance: He is well-developed. HENT:      Head: Normocephalic. Mouth/Throat:      Pharynx: Oropharynx is clear. Eyes:      Conjunctiva/sclera: Conjunctivae normal.   Neck:      Musculoskeletal: Normal range of motion and neck supple. Cardiovascular:      Rate and Rhythm: Normal rate and regular rhythm. Pulmonary:      Effort: Pulmonary effort is normal. No respiratory distress. Breath sounds: Normal breath sounds. Abdominal:      General: Bowel sounds are normal.      Palpations: Abdomen is soft. Tenderness: There is generalized abdominal tenderness. Musculoskeletal: Normal range of motion. Skin:     General: Skin is warm. Capillary Refill: Capillary refill takes less than 2 seconds. Findings: No rash. Neurological:      Mental Status: He is alert and oriented to person, place, and time. Comments: No gross motor or sensory deficits          MDM  Number of Diagnoses or Management Options  Abdominal pain, generalized:   Diagnosis management comments: Patient having abdominal pain. No electrolyte abnormalities. Normal lipase, normal LFTs. No specific right upper quadrant abdominal pain. And CAT scan showing no evidence of cholecystitis. No shortness of breath or chest pain and patient is currently on anticoagulation patient satting 94 to 99% on room air. Unlikely recurrent PE. Lactate normal  I discussed following up with his GI doctor as well as given referral for surgery. Discussed the need for EGD and possibly a HIDA scan.       Discussed the discharge impression and any labs and the results with the patient. Answered any questions and addressed any concerns. Discussed the importance of following up with their primary care provider and/or specialist.  Discussed signs or symptoms that would warrant return back to the ER for further evaluation. The patient is agreeable with discharge. Amount and/or Complexity of Data Reviewed  Clinical lab tests: reviewed  Tests in the radiology section of CPT®: reviewed           Procedures    Recent Results (from the past 24 hour(s))   POC LACTIC ACID    Collection Time: 08/02/20  3:23 AM   Result Value Ref Range    Lactic Acid (POC) 1.76 0.40 - 2.00 mmol/L   SAMPLES BEING HELD    Collection Time: 08/02/20  3:52 AM   Result Value Ref Range    SAMPLES BEING HELD 1SST,1RED,1BLU     COMMENT        Add-on orders for these samples will be processed based on acceptable specimen integrity and analyte stability, which may vary by analyte. CBC WITH AUTOMATED DIFF    Collection Time: 08/02/20  3:52 AM   Result Value Ref Range    WBC 9.5 4.1 - 11.1 K/uL    RBC 4.49 4. 10 - 5.70 M/uL    HGB 13.7 12.1 - 17.0 g/dL    HCT 41.1 36.6 - 50.3 %    MCV 91.5 80.0 - 99.0 FL    MCH 30.5 26.0 - 34.0 PG    MCHC 33.3 30.0 - 36.5 g/dL    RDW 14.0 11.5 - 14.5 %    PLATELET 908 477 - 145 K/uL    MPV 9.7 8.9 - 12.9 FL    NRBC 0.0 0  WBC    ABSOLUTE NRBC 0.00 0.00 - 0.01 K/uL    NEUTROPHILS 63 32 - 75 %    LYMPHOCYTES 28 12 - 49 %    MONOCYTES 7 5 - 13 %    EOSINOPHILS 1 0 - 7 %    BASOPHILS 1 0 - 1 %    IMMATURE GRANULOCYTES 0 0.0 - 0.5 %    ABS. NEUTROPHILS 5.9 1.8 - 8.0 K/UL    ABS. LYMPHOCYTES 2.7 0.8 - 3.5 K/UL    ABS. MONOCYTES 0.7 0.0 - 1.0 K/UL    ABS. EOSINOPHILS 0.1 0.0 - 0.4 K/UL    ABS. BASOPHILS 0.1 0.0 - 0.1 K/UL    ABS. IMM.  GRANS. 0.0 0.00 - 0.04 K/UL    DF SMEAR SCANNED      RBC COMMENTS NORMOCYTIC, NORMOCHROMIC     METABOLIC PANEL, COMPREHENSIVE    Collection Time: 08/02/20  3:52 AM   Result Value Ref Range Sodium 136 136 - 145 mmol/L    Potassium 3.8 3.5 - 5.1 mmol/L    Chloride 101 97 - 108 mmol/L    CO2 30 21 - 32 mmol/L    Anion gap 5 5 - 15 mmol/L    Glucose 129 (H) 65 - 100 mg/dL    BUN 9 6 - 20 MG/DL    Creatinine 1.19 0.70 - 1.30 MG/DL    BUN/Creatinine ratio 8 (L) 12 - 20      GFR est AA >60 >60 ml/min/1.73m2    GFR est non-AA >60 >60 ml/min/1.73m2    Calcium 8.5 8.5 - 10.1 MG/DL    Bilirubin, total 0.5 0.2 - 1.0 MG/DL    ALT (SGPT) 46 12 - 78 U/L    AST (SGOT) 28 15 - 37 U/L    Alk. phosphatase 86 45 - 117 U/L    Protein, total 7.1 6.4 - 8.2 g/dL    Albumin 3.6 3.5 - 5.0 g/dL    Globulin 3.5 2.0 - 4.0 g/dL    A-G Ratio 1.0 (L) 1.1 - 2.2     LIPASE    Collection Time: 08/02/20  3:52 AM   Result Value Ref Range    Lipase 178 73 - 393 U/L       Ct Abd Pelv Wo Cont    Result Date: 8/2/2020  INDICATION: abd pain EXAM: CT Abdomen and Pelvis without IV contrast. No oral contrast. CT dose reduction was achieved through use of a standardized protocol tailored for this examination and automatic exposure control for dose modulation. FINDINGS: No urinary tract stones are seen. There is no hydroureteronephrosis. The kidneys are normal in size. There is no perirenal fluid or ascites. Liver shows steatosis. There is cholelithiasis. Pancreas, adrenal glands, spleen and aorta show no significant enlargement. No inflammation is seen. There is no pneumoperitoneum or significant adenopathy. The bladder is unremarkable. The distal ureters are not dilated. There is no apparent pelvic mass. The appendix is normal. Bowels are not dilated. There is a small fat-containing umbilical hernia. IMPRESSION: 1. No acute disease or significant change since 7/14/2020. 2. Hepatic steatosis. 3. Cholelithiasis. 4. Small fat-containing umbilical hernia.

## 2020-08-02 NOTE — ED NOTES
I have reviewed discharge instructions with the patient. The patient verbalized understanding. Pt confirmed understanding of need for follow up with primary care provider. Pt is not in any current distress and shows no evidence of clinical instability. Pt left ambulatory  Pt left with all personal belongings. Pt states chief complaint has improved with treatment provided    Paperwork given and reviewed with patient, opportunity for questions/clarification given.     Patient Vitals for the past 4 hrs:   Pulse BP SpO2   08/02/20 0615 76 139/61 94 %   08/02/20 0600  145/75 95 %   08/02/20 0545  144/66 92 %   08/02/20 0530  139/72 93 %   08/02/20 0522  144/69 93 %   08/02/20 0315  129/83 96 %

## 2021-03-11 ENCOUNTER — HOSPITAL ENCOUNTER (EMERGENCY)
Age: 56
Discharge: HOME | End: 2021-03-11
Attending: EMERGENCY MEDICINE
Payer: COMMERCIAL

## 2021-03-11 ENCOUNTER — APPOINTMENT (OUTPATIENT)
Dept: CT IMAGING | Age: 56
End: 2021-03-11
Attending: EMERGENCY MEDICINE
Payer: COMMERCIAL

## 2021-03-11 ENCOUNTER — APPOINTMENT (OUTPATIENT)
Dept: GENERAL RADIOLOGY | Age: 56
End: 2021-03-11
Attending: EMERGENCY MEDICINE
Payer: COMMERCIAL

## 2021-03-11 VITALS
HEART RATE: 77 BPM | RESPIRATION RATE: 18 BRPM | SYSTOLIC BLOOD PRESSURE: 135 MMHG | OXYGEN SATURATION: 96 % | WEIGHT: 247.14 LBS | BODY MASS INDEX: 34.6 KG/M2 | DIASTOLIC BLOOD PRESSURE: 82 MMHG | TEMPERATURE: 97.7 F | HEIGHT: 71 IN

## 2021-03-11 DIAGNOSIS — R53.81 MALAISE AND FATIGUE: Primary | ICD-10-CM

## 2021-03-11 DIAGNOSIS — R53.83 MALAISE AND FATIGUE: Primary | ICD-10-CM

## 2021-03-11 LAB
ALBUMIN SERPL-MCNC: 3.7 G/DL (ref 3.5–5)
ALBUMIN/GLOB SERPL: 1.1 {RATIO} (ref 1.1–2.2)
ALP SERPL-CCNC: 89 U/L (ref 45–117)
ALT SERPL-CCNC: 49 U/L (ref 12–78)
ANION GAP SERPL CALC-SCNC: 7 MMOL/L (ref 5–15)
APPEARANCE UR: CLEAR
AST SERPL-CCNC: 30 U/L (ref 15–37)
BACTERIA URNS QL MICRO: NEGATIVE /HPF
BASOPHILS # BLD: 0.1 K/UL (ref 0–0.1)
BASOPHILS NFR BLD: 1 % (ref 0–1)
BILIRUB SERPL-MCNC: 0.4 MG/DL (ref 0.2–1)
BILIRUB UR QL: NEGATIVE
BUN SERPL-MCNC: 5 MG/DL (ref 6–20)
BUN/CREAT SERPL: 5 (ref 12–20)
CALCIUM SERPL-MCNC: 9 MG/DL (ref 8.5–10.1)
CHLORIDE SERPL-SCNC: 103 MMOL/L (ref 97–108)
CO2 SERPL-SCNC: 27 MMOL/L (ref 21–32)
COLOR UR: ABNORMAL
COMMENT, HOLDF: NORMAL
CREAT SERPL-MCNC: 0.95 MG/DL (ref 0.7–1.3)
DIFFERENTIAL METHOD BLD: NORMAL
EOSINOPHIL # BLD: 0 K/UL (ref 0–0.4)
EOSINOPHIL NFR BLD: 0 % (ref 0–7)
EPITH CASTS URNS QL MICRO: ABNORMAL /LPF
ERYTHROCYTE [DISTWIDTH] IN BLOOD BY AUTOMATED COUNT: 14.1 % (ref 11.5–14.5)
GLOBULIN SER CALC-MCNC: 3.4 G/DL (ref 2–4)
GLUCOSE SERPL-MCNC: 134 MG/DL (ref 65–100)
GLUCOSE UR STRIP.AUTO-MCNC: 250 MG/DL
HCT VFR BLD AUTO: 41 % (ref 36.6–50.3)
HGB BLD-MCNC: 13.7 G/DL (ref 12.1–17)
HGB UR QL STRIP: NEGATIVE
HYALINE CASTS URNS QL MICRO: ABNORMAL /LPF (ref 0–5)
IMM GRANULOCYTES # BLD AUTO: 0 K/UL (ref 0–0.04)
IMM GRANULOCYTES NFR BLD AUTO: 0 % (ref 0–0.5)
KETONES UR QL STRIP.AUTO: NEGATIVE MG/DL
LEUKOCYTE ESTERASE UR QL STRIP.AUTO: NEGATIVE
LYMPHOCYTES # BLD: 1.9 K/UL (ref 0.8–3.5)
LYMPHOCYTES NFR BLD: 21 % (ref 12–49)
MAGNESIUM SERPL-MCNC: 2.3 MG/DL (ref 1.6–2.4)
MCH RBC QN AUTO: 31.1 PG (ref 26–34)
MCHC RBC AUTO-ENTMCNC: 33.4 G/DL (ref 30–36.5)
MCV RBC AUTO: 93 FL (ref 80–99)
MONOCYTES # BLD: 0.5 K/UL (ref 0–1)
MONOCYTES NFR BLD: 6 % (ref 5–13)
NEUTS SEG # BLD: 6.4 K/UL (ref 1.8–8)
NEUTS SEG NFR BLD: 72 % (ref 32–75)
NITRITE UR QL STRIP.AUTO: NEGATIVE
NRBC # BLD: 0 K/UL (ref 0–0.01)
NRBC BLD-RTO: 0 PER 100 WBC
PH UR STRIP: 6.5 [PH] (ref 5–8)
PLATELET # BLD AUTO: 229 K/UL (ref 150–400)
PMV BLD AUTO: 9.3 FL (ref 8.9–12.9)
POTASSIUM SERPL-SCNC: 4.1 MMOL/L (ref 3.5–5.1)
PROT SERPL-MCNC: 7.1 G/DL (ref 6.4–8.2)
PROT UR STRIP-MCNC: NEGATIVE MG/DL
RBC # BLD AUTO: 4.41 M/UL (ref 4.1–5.7)
RBC #/AREA URNS HPF: ABNORMAL /HPF (ref 0–5)
RBC MORPH BLD: NORMAL
SAMPLES BEING HELD,HOLD: NORMAL
SARS-COV-2, COV2: NORMAL
SODIUM SERPL-SCNC: 137 MMOL/L (ref 136–145)
SP GR UR REFRACTOMETRY: <1.005 (ref 1–1.03)
TROPONIN I SERPL-MCNC: <0.05 NG/ML
TROPONIN I SERPL-MCNC: <0.05 NG/ML
UR CULT HOLD, URHOLD: NORMAL
UROBILINOGEN UR QL STRIP.AUTO: 1 EU/DL (ref 0.2–1)
WBC # BLD AUTO: 8.9 K/UL (ref 4.1–11.1)
WBC URNS QL MICRO: ABNORMAL /HPF (ref 0–4)

## 2021-03-11 PROCEDURE — 99284 EMERGENCY DEPT VISIT MOD MDM: CPT

## 2021-03-11 PROCEDURE — 74011250637 HC RX REV CODE- 250/637: Performed by: EMERGENCY MEDICINE

## 2021-03-11 PROCEDURE — 80053 COMPREHEN METABOLIC PANEL: CPT

## 2021-03-11 PROCEDURE — 83735 ASSAY OF MAGNESIUM: CPT

## 2021-03-11 PROCEDURE — 74011250636 HC RX REV CODE- 250/636: Performed by: EMERGENCY MEDICINE

## 2021-03-11 PROCEDURE — 71275 CT ANGIOGRAPHY CHEST: CPT

## 2021-03-11 PROCEDURE — 74011000636 HC RX REV CODE- 636: Performed by: RADIOLOGY

## 2021-03-11 PROCEDURE — 71046 X-RAY EXAM CHEST 2 VIEWS: CPT

## 2021-03-11 PROCEDURE — 93005 ELECTROCARDIOGRAM TRACING: CPT

## 2021-03-11 PROCEDURE — 36415 COLL VENOUS BLD VENIPUNCTURE: CPT

## 2021-03-11 PROCEDURE — 81001 URINALYSIS AUTO W/SCOPE: CPT

## 2021-03-11 PROCEDURE — 96374 THER/PROPH/DIAG INJ IV PUSH: CPT

## 2021-03-11 PROCEDURE — 84484 ASSAY OF TROPONIN QUANT: CPT

## 2021-03-11 PROCEDURE — U0005 INFEC AGEN DETEC AMPLI PROBE: HCPCS

## 2021-03-11 PROCEDURE — 85025 COMPLETE CBC W/AUTO DIFF WBC: CPT

## 2021-03-11 RX ORDER — ACETAMINOPHEN 500 MG
1000 TABLET ORAL ONCE
Status: COMPLETED | OUTPATIENT
Start: 2021-03-11 | End: 2021-03-11

## 2021-03-11 RX ORDER — ONDANSETRON 2 MG/ML
4 INJECTION INTRAMUSCULAR; INTRAVENOUS
Status: COMPLETED | OUTPATIENT
Start: 2021-03-11 | End: 2021-03-11

## 2021-03-11 RX ADMIN — IOPAMIDOL 80 ML: 755 INJECTION, SOLUTION INTRAVENOUS at 17:45

## 2021-03-11 RX ADMIN — ONDANSETRON 4 MG: 2 INJECTION INTRAMUSCULAR; INTRAVENOUS at 15:46

## 2021-03-11 RX ADMIN — SODIUM CHLORIDE 1000 ML: 9 INJECTION, SOLUTION INTRAVENOUS at 15:47

## 2021-03-11 RX ADMIN — ACETAMINOPHEN 1000 MG: 500 TABLET ORAL at 15:46

## 2021-03-11 NOTE — ED PROVIDER NOTES
50-year-old gentleman with a history of anxiety and depression presents to the emergency department with sudden sensation of extreme fatigue where every movement, stop, and breath feels like it takes significantly more effort than it should. He is not short of breath but breathing feels like more work. He has had some chest tightness associated with this as well as generalized body aches. He has not had any fevers or chills. Patient reports history of diarrhea after having his gallbladder removed; he has not had any vomiting, recent bouts of diarrhea, hematemesis, or hematochezia. Patient does complain of nausea.   Symptoms are worse with exertion and improved with rest.           Past Medical History:   Diagnosis Date    Anxiety disorder     Depression     Neuropathy of foot     Psychiatric disorder     panic disorder       Past Surgical History:   Procedure Laterality Date    HX OTHER SURGICAL      fatty tumor removed from shoulder blade    HX OTHER SURGICAL  2014    left arm surgery following accident    HX TUMOR REMOVAL      fatty tumor removed from Left shoulder x 2         Family History:   Problem Relation Age of Onset    Heart Attack Father        Social History     Socioeconomic History    Marital status:      Spouse name: Not on file    Number of children: Not on file    Years of education: Not on file    Highest education level: Not on file   Occupational History    Not on file   Social Needs    Financial resource strain: Not on file    Food insecurity     Worry: Not on file     Inability: Not on file    Transportation needs     Medical: Not on file     Non-medical: Not on file   Tobacco Use    Smoking status: Never Smoker    Smokeless tobacco: Current User     Types: Chew   Substance and Sexual Activity    Alcohol use: No     Alcohol/week: 0.0 standard drinks    Drug use: No    Sexual activity: Yes     Partners: Female   Lifestyle    Physical activity     Days per week: Not on file     Minutes per session: Not on file    Stress: Not on file   Relationships    Social connections     Talks on phone: Not on file     Gets together: Not on file     Attends Jew service: Not on file     Active member of club or organization: Not on file     Attends meetings of clubs or organizations: Not on file     Relationship status: Not on file    Intimate partner violence     Fear of current or ex partner: Not on file     Emotionally abused: Not on file     Physically abused: Not on file     Forced sexual activity: Not on file   Other Topics Concern    Not on file   Social History Narrative    ** Merged History Encounter **        48 year old   male admitted for complaints of severely depressed mood, with suicidal thoughts. Pt. Lives with his wife of 12 years and works as a  for [employer]. Pt.has some college. 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 He has been treated for the last 25 years on Xanax by his psychiatrist, Dr. Cindy Brown. He has one prior inpatient admission at [facility], in the last year. Pt. Has been having breakthrough anxiety and \"panic attacks\" despite Xanax doses greater than 3 mg q day and the addition of propanolol. Pt. Hs not been treated with an SSRI. ALLERGIES: Sulfa (sulfonamide antibiotics)    Review of Systems   Constitutional: Positive for fatigue. Negative for chills and fever. Respiratory: Positive for chest tightness. Negative for cough and shortness of breath. Cardiovascular: Negative for chest pain. Gastrointestinal: Positive for nausea. Negative for abdominal pain, constipation, diarrhea and vomiting. Musculoskeletal: Positive for myalgias. Neurological: Positive for weakness (global). Negative for dizziness and light-headedness. Psychiatric/Behavioral: The patient is nervous/anxious. All other systems reviewed and are negative.       Vitals:    03/11/21 1500   BP: 135/82   Pulse: 77 Resp: 18   Temp: 97.7 °F (36.5 °C)   SpO2: 96%   Weight: 112.1 kg (247 lb 2.2 oz)   Height: 5' 11\" (1.803 m)            Physical Exam  Vitals signs and nursing note reviewed. Constitutional:       General: He is not in acute distress. Appearance: He is well-developed. He is obese. HENT:      Head: Normocephalic and atraumatic. Eyes:      Conjunctiva/sclera: Conjunctivae normal.      Pupils: Pupils are equal, round, and reactive to light. Neck:      Musculoskeletal: Normal range of motion. Cardiovascular:      Rate and Rhythm: Normal rate and regular rhythm. Pulmonary:      Effort: Pulmonary effort is normal.   Abdominal:      General: There is no distension. Palpations: Abdomen is soft. Tenderness: There is no abdominal tenderness. There is no right CVA tenderness or left CVA tenderness. Musculoskeletal: Normal range of motion. Skin:     General: Skin is warm and dry. Capillary Refill: Capillary refill takes less than 2 seconds. Neurological:      General: No focal deficit present. Mental Status: He is alert and oriented to person, place, and time. Sensory: No sensory deficit. Motor: No weakness. Gait: Gait abnormal (limp due to hip arthritis). Psychiatric:         Mood and Affect: Mood normal.         Behavior: Behavior normal.          MDM  Number of Diagnoses or Management Options         Procedures    EKG performed at 3:43 PM  Normal sinus rhythm, 70 bpm, normal axis, normal intervals, no ST changes, no T wave changes, no ectopy  EKG interpreted by Jennifer Mcdonald MD            7:04 PM  The patient is resting comfortably and feels better, is alert, talkative, interactive and in no distress. The repeat examination is unremarkable and benign. The patient is neurologically intact, has a normal mental status and is ambulatory in the ED.  The history, exam, diagnostic testing (if any) and the patient's current condition do not suggest arrhythmia, STEMI, seizure, meningitis, stroke, sepsis, subarachnoid hemorrhage, intracranial bleeding, encephalitis or other significant pathology that would warrant further testing, continued ED treatment, admission, neurological consultation, or other specialist evaluation at this point. The vital signs have been stable. The patient's condition is stable and appropriate for discharge. The patient will pursue further outpatient evaluation with the primary care physician or other designated or consulting physician as indicated in the discharge instructions.

## 2021-03-11 NOTE — ED TRIAGE NOTES
Reports generalized body aches with arms pain. States \"it might be an anxiety, I have chest tightness\". Denies fever. Patient is ambulatory in ED triage. Patient reports nausea and blotting.

## 2021-03-11 NOTE — Clinical Note
[address] 
[facility] EMERGENCY DEPT 
[address] [address] [facility] [address] [address] [phone] Work/School Note Date: 3/11/2021 To Whom It May concern: 
 
Albert Crawford was evaulated by the following provider(s): 
Attending Provider: Shagufta Douglass MD.   COVID19 virus is suspected. Per the CDC guidelines we recommend home isolation until the following conditions are all met: 1. At least 10 days have passed since symptoms first appeared and 2. At least 24 hours have passed since last fever without the use of fever-reducing medications and 
3. Symptoms (e.g., cough, shortness of breath) have improved Sincerely, Real Vernon MD

## 2021-03-12 ENCOUNTER — PATIENT OUTREACH (OUTPATIENT)
Dept: CASE MANAGEMENT | Age: 56
End: 2021-03-12

## 2021-03-12 LAB
ATRIAL RATE: 70 BPM
CALCULATED P AXIS, ECG09: 31 DEGREES
CALCULATED R AXIS, ECG10: 0 DEGREES
CALCULATED T AXIS, ECG11: 62 DEGREES
DIAGNOSIS, 93000: NORMAL
P-R INTERVAL, ECG05: 184 MS
Q-T INTERVAL, ECG07: 392 MS
QRS DURATION, ECG06: 84 MS
QTC CALCULATION (BEZET), ECG08: 423 MS
SARS-COV-2, XPLCVT: NOT DETECTED
SOURCE, COVRS: NORMAL
VENTRICULAR RATE, ECG03: 70 BPM

## 2021-03-15 NOTE — PROGRESS NOTES
03/15/21 Attempted patient outreach for COVID follow up call per protocol. Unable to contact patient, COVID NEGATIVE. Resolving ANA PAULA episode.  APM

## 2021-03-15 NOTE — PROGRESS NOTES
03/15/21 Patient returned call, declined need for further Gudelia education, aware of negative COVID test. Encouraged follow up with PCP. Closing encounter.  APM

## 2021-04-15 ENCOUNTER — OFFICE VISIT (OUTPATIENT)
Dept: NEUROLOGY | Age: 56
End: 2021-04-15
Payer: COMMERCIAL

## 2021-04-15 VITALS
TEMPERATURE: 97 F | DIASTOLIC BLOOD PRESSURE: 84 MMHG | HEIGHT: 71 IN | RESPIRATION RATE: 18 BRPM | OXYGEN SATURATION: 96 % | HEART RATE: 78 BPM | WEIGHT: 242 LBS | BODY MASS INDEX: 33.88 KG/M2 | SYSTOLIC BLOOD PRESSURE: 128 MMHG

## 2021-04-15 DIAGNOSIS — G62.9 NEUROPATHY: Primary | ICD-10-CM

## 2021-04-15 PROCEDURE — 99204 OFFICE O/P NEW MOD 45 MIN: CPT | Performed by: SPECIALIST

## 2021-04-15 NOTE — PATIENT INSTRUCTIONS
Patient history reviewed patient examined. In lieu of the history and exam findings would like to propose an evaluation that includes some blood work and eventually an EMG and nerve conduction assessment of both lower extremities. In respect to the hip surgery coming up, we will put off the EMG and nerve conduction assessment until we hear from the patient and know it is a good time to get that scheduled. Good luck on that surgery and see you in the near future.

## 2021-04-15 NOTE — LETTER
4/15/2021 Patient: Carina Lux YOB: 1965 Date of Visit: 4/15/2021 Fallon Rust MD 
[address] [address] [address] Fax: [phone] Dear Fallon Rust MD, Thank you for referring Mr. Virginia Beck to [facility] for evaluation. My notes for this consultation are attached. If you have questions, please do not hesitate to call me. I look forward to following your patient along with you.  
 
 
Sincerely, 
 
Marcus Harrell MD

## 2021-04-15 NOTE — PROGRESS NOTES
Neurology Consult      Subjective:      Glenda Byrne is a 48 y.o. male who comes in today with the following history. Says back in 2013 he ended up having formal orthotics for his feet by I believe a podiatrist which could help with the altered sensibility and pain in the feet. There was plus or minus improvement. Now he thinks the altered sensibility is enhanced with burning stabbing and tingling qualities not only in the feet but just above the ankles. Tends to be more noticeable in the early morning after arising. It is enhanced by walking diminished by nothing. Has no known diabetic history although I see a recent random blood sugar of 134 by primary care. Also I believe a normal CBC with differential.  He denies any weakness there is no falls no peripheral claudication history and is a non-smoker. Does have some upcoming elective surgery with left total hip replacement next week with orthopedics. Current Outpatient Medications   Medication Sig Dispense Refill    nebivolol HCl (BYSTOLIC PO) Take 25 mg by mouth daily.  apixaban (Eliquis) 5 mg tablet Take 5 mg by mouth two (2) times a day.  ALPRAZolam (XANAX) 1 mg tablet Take 1 mg by mouth. 1.5 mg x4      omeprazole (PRILOSEC) 40 mg capsule Take 40 mg by mouth daily.  amitriptyline (ELAVIL) 150 mg tablet Take  by mouth nightly.  Desvenlafaxine SR (PRISTIQ) 50 mg tablet Take 50 mg by mouth.  lisinopril (PRINIVIL, ZESTRIL) 10 mg tablet TAKE 1 TABLET DAILY 90 Tab 2    rivaroxaban (XARELTO) 20 mg tab tablet Take  by mouth daily.  metoprolol tartrate (LOPRESSOR) 25 mg tablet Take  by mouth two (2) times a day.  ezetimibe (ZETIA) 10 mg tablet Take 1 Tab by mouth daily. 30 Tab 3    ergocalciferol (ERGOCALCIFEROL) 50,000 unit capsule Take 1 capsule once a week for 4 weeks, then decrease to 1 capsule a month for 11 months.  15 Cap 0      Allergies   Allergen Reactions    Pollens Extract Runny Nose    Sulfa (Sulfonamide Antibiotics) Hives     Past Medical History:   Diagnosis Date    Anxiety disorder     Depression     Diarrhea     Hypertension     Neuropathy of foot     Panic disorder     Psychiatric disorder     panic disorder      Past Surgical History:   Procedure Laterality Date    HX CHOLECYSTECTOMY      HX OTHER SURGICAL      fatty tumor removed from shoulder blade    HX OTHER SURGICAL  2014    left arm surgery following accident    HX TUMOR REMOVAL      fatty tumor removed from Left shoulder x 2      Social History     Socioeconomic History    Marital status:      Spouse name: Not on file    Number of children: Not on file    Years of education: Not on file    Highest education level: Not on file   Occupational History    Not on file   Social Needs    Financial resource strain: Not on file    Food insecurity     Worry: Not on file     Inability: Not on file    Transportation needs     Medical: Not on file     Non-medical: Not on file   Tobacco Use    Smoking status: Never Smoker    Smokeless tobacco: Current User     Types: Chew   Substance and Sexual Activity    Alcohol use: Never     Alcohol/week: 0.0 standard drinks     Frequency: Never    Drug use: No    Sexual activity: Yes     Partners: Female   Lifestyle    Physical activity     Days per week: Not on file     Minutes per session: Not on file    Stress: Not on file   Relationships    Social connections     Talks on phone: Not on file     Gets together: Not on file     Attends Rastafarian service: Not on file     Active member of club or organization: Not on file     Attends meetings of clubs or organizations: Not on file     Relationship status: Not on file    Intimate partner violence     Fear of current or ex partner: Not on file     Emotionally abused: Not on file     Physically abused: Not on file     Forced sexual activity: Not on file   Other Topics Concern    Not on file   Social History Narrative    ** Merged History Encounter **        48 year old   male admitted for complaints of severely depressed mood, with suicidal thoughts. Pt. Lives with his wife of 12 years and works as a  for [employer]. Pt.has some college. 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 He has been treated for the last 25 years on Xanax by his psychiatrist, Dr. Ron Hanley. Melissa Del Real. He has one prior inpatient admission at [facility], in the last year. Pt. Has been having breakthrough anxiety and \"panic attacks\" despite Xanax doses greater than 3 mg q day and the addition of propanolol. Pt. Hs not been treated with an SSRI. Family History   Problem Relation Age of Onset    Heart Attack Father       Visit Vitals  /84   Pulse 78   Temp 97 °F (36.1 °C) (Temporal)   Resp 18   Ht 5' 11\" (1.803 m)   Wt 109.8 kg (242 lb)   SpO2 96%   BMI 33.75 kg/m²        Review of Systems:   A comprehensive review of systems was negative except for that written in the HPI. Neuro Exam:     Appearance: The patient is well developed, well nourished, provides a coherent history and is in no acute distress. Mental Status: Oriented to time, place and person. Mood and affect appropriate. Cranial Nerves:   Intact visual fields. Fundi are benign. AFSHIN, EOM's full, no nystagmus, no ptosis. Facial sensation is normal. Corneal reflexes are intact. Facial movement is symmetric. Hearing is normal bilaterally. Palate is midline with normal sternocleidomastoid and trapezius muscles are normal. Tongue is midline. Motor:  5/5 strength in upper and lower proximal and distal muscles. Normal bulk and tone. No fasciculations. Reflexes:   Deep tendon reflexes 1-2+/4 and symmetrical.   Sensory:   Normal to touch, pinprick and slightly early termination vibration. Gait:  Normal gait. Including on toes on heels independent leg standing tandem and Romberg. Tremor:   No tremor noted. Cerebellar:  No cerebellar signs present. Neurovascular:  Normal heart sounds and regular rhythm, peripheral pulses intact, and no carotid bruits. Toes downgoing no clonus no Saldaña's. Assessment:   Altered lower extremity sensibility. Will pursue this as a neuropathy work-up with fasting labs and eventually an EMG nerve conduction of both lower extremities. We will have to wait after he rehabs from his left hip surgery and he will give us a call. Further suggestions could follow. Plan:   Revisit pended.   Signed by :  Monserrat Rodriguez MD

## 2022-01-01 ENCOUNTER — HOSPITAL ENCOUNTER (EMERGENCY)
Age: 57
Discharge: HOME | End: 2022-01-01
Attending: EMERGENCY MEDICINE
Payer: COMMERCIAL

## 2022-01-01 ENCOUNTER — APPOINTMENT (OUTPATIENT)
Dept: GENERAL RADIOLOGY | Age: 57
End: 2022-01-01
Attending: EMERGENCY MEDICINE
Payer: COMMERCIAL

## 2022-01-01 VITALS
TEMPERATURE: 97.5 F | SYSTOLIC BLOOD PRESSURE: 152 MMHG | BODY MASS INDEX: 33.33 KG/M2 | OXYGEN SATURATION: 100 % | HEIGHT: 69 IN | RESPIRATION RATE: 20 BRPM | DIASTOLIC BLOOD PRESSURE: 97 MMHG | WEIGHT: 225 LBS

## 2022-01-01 DIAGNOSIS — R07.9 ACUTE CHEST PAIN: Primary | ICD-10-CM

## 2022-01-01 LAB
ALBUMIN SERPL-MCNC: 3.4 G/DL (ref 3.5–5)
ALBUMIN/GLOB SERPL: 1 {RATIO} (ref 1.1–2.2)
ALP SERPL-CCNC: 88 U/L (ref 45–117)
ALT SERPL-CCNC: 27 U/L (ref 12–78)
ANION GAP SERPL CALC-SCNC: 7 MMOL/L (ref 5–15)
AST SERPL-CCNC: 16 U/L (ref 15–37)
BASOPHILS # BLD: 0.1 K/UL (ref 0–0.1)
BASOPHILS NFR BLD: 1 % (ref 0–1)
BILIRUB SERPL-MCNC: 0.4 MG/DL (ref 0.2–1)
BUN SERPL-MCNC: 9 MG/DL (ref 6–20)
BUN/CREAT SERPL: 9 (ref 12–20)
CALCIUM SERPL-MCNC: 8.9 MG/DL (ref 8.5–10.1)
CHLORIDE SERPL-SCNC: 104 MMOL/L (ref 97–108)
CO2 SERPL-SCNC: 30 MMOL/L (ref 21–32)
CREAT SERPL-MCNC: 1.04 MG/DL (ref 0.7–1.3)
D DIMER PPP FEU-MCNC: 0.19 MG/L FEU (ref 0–0.65)
DIFFERENTIAL METHOD BLD: NORMAL
EOSINOPHIL # BLD: 0.1 K/UL (ref 0–0.4)
EOSINOPHIL NFR BLD: 1 % (ref 0–7)
ERYTHROCYTE [DISTWIDTH] IN BLOOD BY AUTOMATED COUNT: 13.1 % (ref 11.5–14.5)
GLOBULIN SER CALC-MCNC: 3.5 G/DL (ref 2–4)
GLUCOSE SERPL-MCNC: 115 MG/DL (ref 65–100)
HCT VFR BLD AUTO: 39.8 % (ref 36.6–50.3)
HGB BLD-MCNC: 13.3 G/DL (ref 12.1–17)
IMM GRANULOCYTES # BLD AUTO: 0 K/UL (ref 0–0.04)
IMM GRANULOCYTES NFR BLD AUTO: 0 % (ref 0–0.5)
LIPASE SERPL-CCNC: 131 U/L (ref 73–393)
LYMPHOCYTES # BLD: 2 K/UL (ref 0.8–3.5)
LYMPHOCYTES NFR BLD: 24 % (ref 12–49)
MAGNESIUM SERPL-MCNC: 2.3 MG/DL (ref 1.6–2.4)
MCH RBC QN AUTO: 30.1 PG (ref 26–34)
MCHC RBC AUTO-ENTMCNC: 33.4 G/DL (ref 30–36.5)
MCV RBC AUTO: 90 FL (ref 80–99)
MONOCYTES # BLD: 0.5 K/UL (ref 0–1)
MONOCYTES NFR BLD: 7 % (ref 5–13)
NEUTS SEG # BLD: 5.5 K/UL (ref 1.8–8)
NEUTS SEG NFR BLD: 67 % (ref 32–75)
NRBC # BLD: 0 K/UL (ref 0–0.01)
NRBC BLD-RTO: 0 PER 100 WBC
PLATELET # BLD AUTO: 223 K/UL (ref 150–400)
PMV BLD AUTO: 9.9 FL (ref 8.9–12.9)
POTASSIUM SERPL-SCNC: 3.6 MMOL/L (ref 3.5–5.1)
PROT SERPL-MCNC: 6.9 G/DL (ref 6.4–8.2)
RBC # BLD AUTO: 4.42 M/UL (ref 4.1–5.7)
SODIUM SERPL-SCNC: 141 MMOL/L (ref 136–145)
TROPONIN-HIGH SENSITIVITY: 5 NG/L (ref 0–76)
TROPONIN-HIGH SENSITIVITY: 6 NG/L (ref 0–76)
WBC # BLD AUTO: 8.2 K/UL (ref 4.1–11.1)

## 2022-01-01 PROCEDURE — 74011000250 HC RX REV CODE- 250: Performed by: EMERGENCY MEDICINE

## 2022-01-01 PROCEDURE — 85379 FIBRIN DEGRADATION QUANT: CPT

## 2022-01-01 PROCEDURE — 85025 COMPLETE CBC W/AUTO DIFF WBC: CPT

## 2022-01-01 PROCEDURE — 84484 ASSAY OF TROPONIN QUANT: CPT

## 2022-01-01 PROCEDURE — 83735 ASSAY OF MAGNESIUM: CPT

## 2022-01-01 PROCEDURE — 36415 COLL VENOUS BLD VENIPUNCTURE: CPT

## 2022-01-01 PROCEDURE — 99283 EMERGENCY DEPT VISIT LOW MDM: CPT

## 2022-01-01 PROCEDURE — 83690 ASSAY OF LIPASE: CPT

## 2022-01-01 PROCEDURE — 74011250637 HC RX REV CODE- 250/637: Performed by: EMERGENCY MEDICINE

## 2022-01-01 PROCEDURE — 80053 COMPREHEN METABOLIC PANEL: CPT

## 2022-01-01 PROCEDURE — 93005 ELECTROCARDIOGRAM TRACING: CPT

## 2022-01-01 PROCEDURE — 71046 X-RAY EXAM CHEST 2 VIEWS: CPT

## 2022-01-01 RX ADMIN — LIDOCAINE HYDROCHLORIDE 40 ML: 20 SOLUTION TOPICAL at 05:10

## 2022-01-01 NOTE — ED TRIAGE NOTES
Pt arrives with complaint of chest pressure x several weeks that has worsened the last 3 days. Denies SOB at this time.

## 2022-01-01 NOTE — DISCHARGE INSTRUCTIONS
We hope that we have addressed all of your medical concerns. The examination and treatment you received in the Emergency Department were for an emergent problem and were not intended as complete care. It is important that you follow up with your healthcare provider(s) for ongoing care. If your symptoms worsen or do not improve as expected, and you are unable to reach your usual health care provider(s), you should return to the Emergency Department. Today's healthcare is undergoing tremendous change, and patient satisfaction surveys are one of the many tools to assess the quality of medical care. You may receive a survey from the [employer] organization regarding your experience in the Emergency Department. I hope that your experience has been completely positive, particularly the medical care that I provided. As such, please participate in the survey; anything less than excellent does not meet my expectations or intentions. [facility] and [facility] participate in nationally recognized quality of care measures. If your blood pressure is greater than 120/80, as reported below, we urge that you seek medical care to address the potential of high blood pressure, commonly known as hypertension. Hypertension can be hereditary or can be caused by certain medical conditions, pain, stress, or \"white coat syndrome. \"       Please make an appointment with your health care provider(s) for follow up of your Emergency Department visit. VITALS:   Patient Vitals for the past 8 hrs:   Temp Resp BP SpO2   01/01/22 0437 97.5 °F (36.4 °C) 20 (!) 152/97 100 %          Thank you for allowing us to provide you with medical care today. We realize that you have many choices for your emergency care needs. Please choose us in the future for any continued health care needs. Elise Dominguez, [address] [place] [address] [address]   Office: [phone]            Recent Results (from the past 24 hour(s))   CBC WITH AUTOMATED DIFF    Collection Time: 01/01/22  4:34 AM   Result Value Ref Range    WBC 8.2 4.1 - 11.1 K/uL    RBC 4.42 4.10 - 5.70 M/uL    HGB 13.3 12.1 - 17.0 g/dL    HCT 39.8 36.6 - 50.3 %    MCV 90.0 80.0 - 99.0 FL    MCH 30.1 26.0 - 34.0 PG    MCHC 33.4 30.0 - 36.5 g/dL    RDW 13.1 11.5 - 14.5 %    PLATELET 598 149 - 691 K/uL    MPV 9.9 8.9 - 12.9 FL    NRBC 0.0 0  WBC    ABSOLUTE NRBC 0.00 0.00 - 0.01 K/uL    NEUTROPHILS 67 32 - 75 %    LYMPHOCYTES 24 12 - 49 %    MONOCYTES 7 5 - 13 %    EOSINOPHILS 1 0 - 7 %    BASOPHILS 1 0 - 1 %    IMMATURE GRANULOCYTES 0 0.0 - 0.5 %    ABS. NEUTROPHILS 5.5 1.8 - 8.0 K/UL    ABS. LYMPHOCYTES 2.0 0.8 - 3.5 K/UL    ABS. MONOCYTES 0.5 0.0 - 1.0 K/UL    ABS. EOSINOPHILS 0.1 0.0 - 0.4 K/UL    ABS. BASOPHILS 0.1 0.0 - 0.1 K/UL    ABS. IMM. GRANS. 0.0 0.00 - 0.04 K/UL    DF AUTOMATED     METABOLIC PANEL, COMPREHENSIVE    Collection Time: 01/01/22  4:34 AM   Result Value Ref Range    Sodium 141 136 - 145 mmol/L    Potassium 3.6 3.5 - 5.1 mmol/L    Chloride 104 97 - 108 mmol/L    CO2 30 21 - 32 mmol/L    Anion gap 7 5 - 15 mmol/L    Glucose 115 (H) 65 - 100 mg/dL    BUN 9 6 - 20 MG/DL    Creatinine 1.04 0.70 - 1.30 MG/DL    BUN/Creatinine ratio 9 (L) 12 - 20      GFR est AA >60 >60 ml/min/1.73m2    GFR est non-AA >60 >60 ml/min/1.73m2    Calcium 8.9 8.5 - 10.1 MG/DL    Bilirubin, total 0.4 0.2 - 1.0 MG/DL    ALT (SGPT) 27 12 - 78 U/L    AST (SGOT) 16 15 - 37 U/L    Alk.  phosphatase 88 45 - 117 U/L    Protein, total 6.9 6.4 - 8.2 g/dL    Albumin 3.4 (L) 3.5 - 5.0 g/dL    Globulin 3.5 2.0 - 4.0 g/dL    A-G Ratio 1.0 (L) 1.1 - 2.2     LIPASE    Collection Time: 01/01/22  4:34 AM   Result Value Ref Range    Lipase 131 73 - 393 U/L   MAGNESIUM    Collection Time: 01/01/22  4:34 AM   Result Value Ref Range    Magnesium 2.3 1.6 - 2.4 mg/dL   TROPONIN-HIGH SENSITIVITY    Collection Time: 01/01/22  4:34 AM Result Value Ref Range    Troponin-High Sensitivity 5 0 - 76 ng/L   D DIMER    Collection Time: 01/01/22  4:34 AM   Result Value Ref Range    D-dimer 0.19 0.00 - 0.65 mg/L FEU       XR CHEST PA LAT    Result Date: 1/1/2022  EXAM:  XR CHEST PA LAT INDICATION:   Chest Pain COMPARISON: Chest radiograph 3/11/2021. FINDINGS: PA and lateral radiographs of the chest were obtained. No evidence of focal consolidation. No pleural effusion or pneumothorax. Heart, dora, mediastinum are within normal limits. No acute osseous abnormalities. No acute cardiopulmonary process.

## 2022-01-01 NOTE — ED PROVIDER NOTES
HPI   81-year-old male presents with chest pressure onset Wednesday, constant, worse with laying back and improved with sitting up. Patient states symptoms started a few weeks ago and have been intermittent but has been more consistent since Wednesday. States when symptoms initially started it was worse when he laid down and felt pressure and palpitation to his chest.  He noticed it was worse after walking around fluid skin turgor. He denies history of similar symptoms. He has a history of pulmonary embolism and takes Eliquis daily but states the pain with the PE was much worse. Denies nausea, vomiting, fever, cough, abdominal pain, diarrhea, sweating, bloody or black stools, pain or swelling in his legs. Patient states his symptoms are sometimes associated with burping. He has a history of gallstones and is status post cholecystectomy.   Past Medical History:   Diagnosis Date    Anxiety disorder     Depression     Diarrhea     Hypertension     Neuropathy of foot     Panic disorder     Psychiatric disorder     panic disorder       Past Surgical History:   Procedure Laterality Date    HX CHOLECYSTECTOMY      HX OTHER SURGICAL      fatty tumor removed from shoulder blade    HX OTHER SURGICAL  2014    left arm surgery following accident    HX TUMOR REMOVAL      fatty tumor removed from Left shoulder x 2         Family History:   Problem Relation Age of Onset    Heart Attack Father        Social History     Socioeconomic History    Marital status:      Spouse name: Not on file    Number of children: Not on file    Years of education: Not on file    Highest education level: Not on file   Occupational History    Not on file   Tobacco Use    Smoking status: Never Smoker    Smokeless tobacco: Current User     Types: Chew   Substance and Sexual Activity    Alcohol use: Never     Alcohol/week: 0.0 standard drinks    Drug use: No    Sexual activity: Yes     Partners: Female   Other Topics Concern    Not on file   Social History Narrative    ** Merged History Encounter **        48 year old   male admitted for complaints of severely depressed mood, with suicidal thoughts. Pt. Lives with his wife of 12 years and works as a  for [employer]. Pt.has some college. 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 He has been treated for the last 25 years on Xanax by his psychiatrist, Dr. Mckeon. Geralynn Lefort. He has one prior inpatient admission at [facility], in the last year. Pt. Has been having breakthrough anxiety and \"panic attacks\" despite Xanax doses greater than 3 mg q day and the addition of propanolol. Pt. Hs not been treated with an SSRI. Social Determinants of Health     Financial Resource Strain:     Difficulty of Paying Living Expenses: Not on file   Food Insecurity:     Worried About Running Out of Food in the Last Year: Not on file    Beckie of Food in the Last Year: Not on file   Transportation Needs:     Lack of Transportation (Medical): Not on file    Lack of Transportation (Non-Medical):  Not on file   Physical Activity:     Days of Exercise per Week: Not on file    Minutes of Exercise per Session: Not on file   Stress:     Feeling of Stress : Not on file   Social Connections:     Frequency of Communication with Friends and Family: Not on file    Frequency of Social Gatherings with Friends and Family: Not on file    Attends Episcopalian Services: Not on file    Active Member of Clubs or Organizations: Not on file    Attends Club or Organization Meetings: Not on file    Marital Status: Not on file   Intimate Partner Violence:     Fear of Current or Ex-Partner: Not on file    Emotionally Abused: Not on file    Physically Abused: Not on file    Sexually Abused: Not on file   Housing Stability:     Unable to Pay for Housing in the Last Year: Not on file    Number of [place] in the Last Year: Not on file    Unstable Housing in the Last Year: Not on file         ALLERGIES: Pollens extract, Statins-hmg-coa reductase inhibitors, and Sulfa (sulfonamide antibiotics)    Review of Systems   Constitutional: Negative for chills, diaphoresis and fever. HENT: Negative for congestion. Respiratory: Negative for cough and shortness of breath. Cardiovascular: Positive for chest pain. Gastrointestinal: Negative for abdominal pain, constipation, diarrhea, nausea and vomiting. Genitourinary: Negative for dysuria. Musculoskeletal: Negative for back pain, neck pain and neck stiffness. Skin: Negative for rash. Neurological: Negative for headaches. All other systems reviewed and are negative.       Vitals:    01/01/22 0437   BP: (!) 152/97   Resp: 20   Temp: 97.5 °F (36.4 °C)   SpO2: 100%   Weight: 102.1 kg (225 lb)   Height: 5' 9\" (1.753 m)            Physical Exam   Physical Examination: General appearance - alert, well appearing, and in no distress, oriented to person, place, and time and normal appearing weight  Eyes - pupils equal and reactive, extraocular eye movements intact  Neck - supple, no significant adenopathy  Chest - clear to auscultation, no wheezes, rales or rhonchi, symmetric air entry  Heart - normal rate, regular rhythm, normal S1, S2, no murmurs, rubs, clicks or gallops  Abdomen - soft, nontender, nondistended, no masses or organomegaly  Back exam - full range of motion, no tenderness, palpable spasm or pain on motion  Neurological - alert, oriented, normal speech, no focal findings or movement disorder noted  Musculoskeletal - no joint tenderness, deformity or swelling  Extremities - peripheral pulses normal, no pedal edema, no clubbing or cyanosis  Skin - normal coloration and turgor, no rashes, no suspicious skin lesions noted  MDM  Number of Diagnoses or Management Options     Amount and/or Complexity of Data Reviewed  Clinical lab tests: ordered and reviewed  Tests in the radiology section of CPT®: ordered and reviewed  Decide to obtain previous medical records or to obtain history from someone other than the patient: yes  Review and summarize past medical records: yes  Independent visualization of images, tracings, or specimens: yes    Patient Progress  Patient progress: improved         Procedures  ED EKG interpretation:  Rhythm: normal sinus rhythm; and regular . Rate (approx.): 76; Axis: normal; P wave: normal; QRS interval: normal ; ST/T wave: normal;  EKG documented by Boyd Mendoza MD    6:36 AM  Patient reassessed. Pain resolved at this time. Vital signs stable. No increased work of breathing accessory muscle use. Will repeat troponin if negative will discharge with cardiology follow-up.

## 2022-01-03 LAB
ATRIAL RATE: 76 BPM
CALCULATED P AXIS, ECG09: 28 DEGREES
CALCULATED R AXIS, ECG10: -2 DEGREES
CALCULATED T AXIS, ECG11: 41 DEGREES
DIAGNOSIS, 93000: NORMAL
P-R INTERVAL, ECG05: 188 MS
Q-T INTERVAL, ECG07: 408 MS
QRS DURATION, ECG06: 92 MS
QTC CALCULATION (BEZET), ECG08: 459 MS
VENTRICULAR RATE, ECG03: 76 BPM

## 2022-02-15 ENCOUNTER — TELEPHONE (OUTPATIENT)
Dept: FAMILY MEDICINE CLINIC | Age: 57
End: 2022-02-15

## 2022-02-15 NOTE — TELEPHONE ENCOUNTER
Pt scheduled         ----- Message from Jarrett Bolton sent at 2/15/2022  9:56 AM EST -----  Subject: Appointment Request    Reason for Call: New Patient Request Appointment    QUESTIONS  Type of Appointment? New Patient/New to Provider  Reason for appointment request? No appointments available during search  Additional Information for Provider? Patient needs to be established with   [place]. Please call back in regards to this.   ---------------------------------------------------------------------------  --------------  CALL BACK INFO  What is the best way for the office to contact you? OK to leave message on   voicemail  Preferred Call Back Phone Number? [phone]  ---------------------------------------------------------------------------  --------------  SCRIPT ANSWERS  Relationship to Patient? Self  Specialty Confirmation? Primary Care  Is this the first appointment to establish care for a ? No  Have you been diagnosed with, awaiting test results for, or told that you   are suspected of having COVID-19 (Coronavirus)? (If patient has tested   negative or was tested as a requirement for work, school, or travel and   not based on symptoms, answer no)? No  Within the past 10 days have you developed any of the following symptoms   (answer no if symptoms have been present longer than 10 days or began   more than 10 days ago)? Fever or Chills, Cough, Shortness of breath or   difficulty breathing, Loss of taste or smell, Sore throat, Nasal   congestion, Sneezing or runny nose, Fatigue or generalized body aches   (answer no if pain is specific to a body part e.g. back pain), Diarrhea,   Headache? No  Have you had close contact with someone with COVID-19 in the last 7 days? No  (Service Expert - click yes below to proceed with [place] [employer] [place] [employer]ual   Scheduling)?  Yes

## 2022-02-17 ENCOUNTER — OFFICE VISIT (OUTPATIENT)
Dept: FAMILY MEDICINE CLINIC | Age: 57
End: 2022-02-17
Payer: COMMERCIAL

## 2022-02-17 VITALS
SYSTOLIC BLOOD PRESSURE: 134 MMHG | WEIGHT: 219.2 LBS | HEIGHT: 71 IN | DIASTOLIC BLOOD PRESSURE: 85 MMHG | OXYGEN SATURATION: 98 % | TEMPERATURE: 97.9 F | HEART RATE: 77 BPM | RESPIRATION RATE: 20 BRPM | BODY MASS INDEX: 30.69 KG/M2

## 2022-02-17 DIAGNOSIS — E78.5 HYPERLIPIDEMIA, UNSPECIFIED HYPERLIPIDEMIA TYPE: ICD-10-CM

## 2022-02-17 DIAGNOSIS — K21.9 GASTROESOPHAGEAL REFLUX DISEASE, UNSPECIFIED WHETHER ESOPHAGITIS PRESENT: ICD-10-CM

## 2022-02-17 DIAGNOSIS — F41.0 PANIC DISORDER: ICD-10-CM

## 2022-02-17 DIAGNOSIS — I10 HYPERTENSION, UNSPECIFIED TYPE: ICD-10-CM

## 2022-02-17 DIAGNOSIS — Z76.89 ENCOUNTER TO ESTABLISH CARE: Primary | ICD-10-CM

## 2022-02-17 DIAGNOSIS — Z86.711 HISTORY OF PULMONARY EMBOLISM: ICD-10-CM

## 2022-02-17 PROCEDURE — 99203 OFFICE O/P NEW LOW 30 MIN: CPT | Performed by: STUDENT IN AN ORGANIZED HEALTH CARE EDUCATION/TRAINING PROGRAM

## 2022-02-17 NOTE — PROGRESS NOTES
Patient stated name &     Chief Complaint   Patient presents with    New Patient     To be established        Health Maintenance Due   Topic    Hepatitis C Screening     COVID-19 Vaccine (1)    Pneumococcal 0-64 years (1 of 2 - PPSV23)    Depression Monitoring     DTaP/Tdap/Td series (1 - Tdap)    Colorectal Cancer Screening Combo     Shingrix Vaccine Age 50> (1 of 2)    Lipid Screen     Flu Vaccine (1)       Wt Readings from Last 3 Encounters:   22 219 lb 3.2 oz (99.4 kg)   22 225 lb (102.1 kg)   04/15/21 242 lb (109.8 kg)     Temp Readings from Last 3 Encounters:   22 97.9 °F (36.6 °C) (Temporal)   22 97.5 °F (36.4 °C)   04/15/21 97 °F (36.1 °C) (Temporal)     BP Readings from Last 3 Encounters:   22 134/85   22 (!) 152/97   04/15/21 128/84     Pulse Readings from Last 3 Encounters:   22 77   04/15/21 78   21 77         Learning Assessment:  :     Learning Assessment 4/15/2021   PRIMARY LEARNER Patient   BARRIERS PRIMARY LEARNER NONE   PRIMARY LANGUAGE ENGLISH   LEARNER PREFERENCE PRIMARY READING   ANSWERED BY Glenis Petit   RELATIONSHIP SELF       Depression Screening:  :     3 most recent PHQ Screens 2022   PHQ Not Done -   Little interest or pleasure in doing things Not at all   Feeling down, depressed, irritable, or hopeless Not at all   Total Score PHQ 2 0       Fall Risk Assessment:  :     Fall Risk Assessment, last 12 mths 2022   Able to walk? Yes   Fall in past 12 months? 0   Do you feel unsteady? 0   Are you worried about falling 0       Abuse Screening:  :     Abuse Screening Questionnaire 2022   Do you ever feel afraid of your partner? N   Are you in a relationship with someone who physically or mentally threatens you? N   Is it safe for you to go home?  Y       Coordination of Care Questionnaire:  :     1) Have you been to an emergency room, urgent care clinic since your last visit? no   Hospitalized since your last visit? no 2) Have you seen or consulted any other health care providers outside of [address] [place] [address] since your last visit? no  (Include any pap smears or colon screenings in this section.)    3) Do you have an Advance Directive on file? no  Are you interested in receiving information about Advance Directives? no    Patient is accompanied by self I have received verbal consent from Brenda Randhawa to discuss any/all medical information while they are present in the room.

## 2022-02-18 NOTE — PROGRESS NOTES
Moon Bragg (: 1965) is a 55 y.o. male, new patient, here for evaluation of the following chief complaint(s):  New Patient (To be established)       ASSESSMENT/PLAN:  Below is the assessment and plan developed based on review of pertinent history, physical exam, labs, studies, and medications. 1. Encounter to establish care  - New pt. No records available at this time  - Patient signed record release for previous PCP  - Patient to follow-up within the next month for his annual exam     2. Hypertension, unspecified type  - Chronic. Controlled. - Per patient he is on Bystolic and Lisinopril   - Continue on current regimen. Awaiting records and previous lab results. 3. History of pulmonary embolism  - Per patient history of 2 previous PE  - Per pt has been on Eliquis 2.5mg BID  - Has previously been evaluated by hematology as well. Awaiting records for review. 4. Panic disorder  - Chronic. Controled on Xanax, amitriptyline and Pristiq - all meds are managed by his psychiatrist     5. Hyperlipidemia, unspecified hyperlipidemia type  - Chronic.   - Intolerant to statins.   - Requesting records. Repeat lipid panel at upcoming annual exam.     6. Gastroesophageal reflux disease, unspecified whether esophagitis present  - Follow-up by GI  - Stable on PPI    Return in about 4 weeks (around 3/17/2022) for Annual Exam.      SUBJECTIVE/OBJECTIVE:  Patient is a 52yo male with pmh of PE, HTN, hyperlipidemia, panic disorder and GERD who presents to the office today to establish care with a new PCP. Patient has hx of 2 pulmonary embolisms in the past (2016 and 2017) and has remained on Eliquis BID. He states he is currently only being prescribed 2.5mg twice per day. Patient also has a hx of HTN that is controlled on lisinopril and bystolic. He is followed by his psychiatrist for his panic disorder and anxiety. He also follows with gastroenterology for his GERD. His GERD is currently well controlled on PPI. Has been dx and treated for H Pylori in the past. Finally, he has hx of hyperlipidemia but is intolerant to statins. Patient denies any acute concerns today. Review of Systems   Constitutional: Negative for chills and fever. HENT: Negative for congestion and rhinorrhea. Eyes: Negative for visual disturbance. Respiratory: Negative for cough and shortness of breath. Cardiovascular: Negative for chest pain, palpitations and leg swelling. Gastrointestinal: Negative for abdominal pain, nausea and vomiting. Genitourinary: Negative for dysuria. Neurological: Negative for dizziness and numbness. Vital Signs  Temp: 97.9 °F (36.6 °C) (02/17/22 1443)  Pulse (Heart Rate): 77 (02/17/22 1443)  Resp Rate: 20 (02/17/22 1443)  O2 Sat (%): 98 % (02/17/22 1443)  BP: 134/85 (02/17/22 1443)  MAP (Calculated): 101 (02/17/22 1443)  BP 1 Location: Right arm (02/17/22 1443)  BP Patient Position: Sitting (02/17/22 1443)      Physical Exam  Constitutional:       General: He is not in acute distress. Appearance: He is not toxic-appearing. HENT:      Head: Normocephalic and atraumatic. Right Ear: Tympanic membrane and ear canal normal.      Left Ear: Tympanic membrane and ear canal normal.      Nose: Nose normal.      Mouth/Throat:      Mouth: Mucous membranes are moist.      Pharynx: Oropharynx is clear. No oropharyngeal exudate or posterior oropharyngeal erythema. Eyes:      Extraocular Movements: Extraocular movements intact. Conjunctiva/sclera: Conjunctivae normal.      Pupils: Pupils are equal, round, and reactive to light. Cardiovascular:      Rate and Rhythm: Normal rate and regular rhythm. Pulses: Normal pulses. Heart sounds: Normal heart sounds. No murmur heard. No gallop. Pulmonary:      Effort: Pulmonary effort is normal. No respiratory distress. Breath sounds: Normal breath sounds. No wheezing or rhonchi.    Abdominal:      General: Bowel sounds are normal. There is no distension. Palpations: Abdomen is soft. Tenderness: There is no abdominal tenderness. Musculoskeletal:      Right lower leg: No edema. Left lower leg: No edema. Neurological:      General: No focal deficit present. Mental Status: He is alert and oriented to person, place, and time. Motor: No weakness. Gait: Gait normal.         An electronic signature was used to authenticate this note.   -- Tate Sullivan MD

## 2022-03-14 PROBLEM — R73.03 PREDIABETES: Status: ACTIVE | Noted: 2022-03-14

## 2022-03-17 ENCOUNTER — OFFICE VISIT (OUTPATIENT)
Dept: FAMILY MEDICINE CLINIC | Age: 57
End: 2022-03-17
Payer: COMMERCIAL

## 2022-03-17 VITALS
BODY MASS INDEX: 29.96 KG/M2 | HEIGHT: 71 IN | HEART RATE: 60 BPM | OXYGEN SATURATION: 98 % | SYSTOLIC BLOOD PRESSURE: 128 MMHG | RESPIRATION RATE: 16 BRPM | DIASTOLIC BLOOD PRESSURE: 84 MMHG | WEIGHT: 214 LBS | TEMPERATURE: 97.1 F

## 2022-03-17 DIAGNOSIS — Z86.711 HISTORY OF PULMONARY EMBOLISM: ICD-10-CM

## 2022-03-17 DIAGNOSIS — K21.9 GASTROESOPHAGEAL REFLUX DISEASE, UNSPECIFIED WHETHER ESOPHAGITIS PRESENT: ICD-10-CM

## 2022-03-17 DIAGNOSIS — I10 HYPERTENSION, UNSPECIFIED TYPE: Primary | ICD-10-CM

## 2022-03-17 DIAGNOSIS — E55.9 VITAMIN D DEFICIENCY: ICD-10-CM

## 2022-03-17 PROCEDURE — 99214 OFFICE O/P EST MOD 30 MIN: CPT | Performed by: STUDENT IN AN ORGANIZED HEALTH CARE EDUCATION/TRAINING PROGRAM

## 2022-03-17 RX ORDER — NEBIVOLOL 10 MG/1
10 TABLET ORAL DAILY
Qty: 90 TABLET | Refills: 1 | Status: SHIPPED | OUTPATIENT
Start: 2022-03-17 | End: 2022-08-26

## 2022-03-17 RX ORDER — LISINOPRIL 20 MG/1
20 TABLET ORAL DAILY
Qty: 90 TABLET | Refills: 1 | Status: SHIPPED | OUTPATIENT
Start: 2022-03-17 | End: 2022-09-20

## 2022-03-17 RX ORDER — OMEPRAZOLE 40 MG/1
40 CAPSULE, DELAYED RELEASE ORAL DAILY
Qty: 90 CAPSULE | Refills: 1 | Status: SHIPPED | OUTPATIENT
Start: 2022-03-17 | End: 2022-10-03

## 2022-03-17 NOTE — PROGRESS NOTES
Assessment/Plan:     Diagnoses and all orders for this visit:    1. Hypertension, unspecified type  -     MICROALBUMIN, UR, RAND W/ MICROALB/CREAT RATIO; Future  -     METABOLIC PANEL, BASIC; Future  -     lisinopriL (PRINIVIL, ZESTRIL) 20 mg tablet; Take 1 Tablet by mouth daily. -     nebivoloL (Bystolic) 10 mg tablet; Take 1 Tablet by mouth daily. - Chronic. BP well controlled at home and in the office.   - Tolerating medication.   - Continue on lisinopril and Bystolic. Refilled today  - Will check blood work. Follow-up in 3-4 months for annual exam     2. Gastroesophageal reflux disease, unspecified whether esophagitis present  -     omeprazole (PRILOSEC) 40 mg capsule; Take 1 Capsule by mouth daily. - Chronic. Well controlled on omeprazole   - Medication refilled today. 3. Vitamin D deficiency  -     VITAMIN D, 25 HYDROXY; Future  - Previous hx of vitamin d deficiency per patient. Will check level today and replace if needed     4. History of pulmonary embolism  -     apixaban (ELIQUIS) 2.5 mg tablet; Take 1 Tablet by mouth two (2) times a day. - Hx of 2 previous PEs (2016 and 2017)   - Has tolerated Eliquis 2.5mg BID and will be on long term prophylaxis  - No bleeding concerns. Refilled med today       Discussed expected course/resolution/complications of diagnosis in detail with patient. Medication risks/benefits/costs/interactions/alternatives discussed with patient. Pt was given after visit summary which includes diagnoses, current medications & vitals. Pt expressed understanding with the diagnosis and plan        Subjective:      Sherman Soto is a 55 y.o. male who presents for had concerns including Follow-up, Hypertension, and Medication Refill (Mail Order). Current Outpatient Medications   Medication Sig Dispense Refill    apixaban (ELIQUIS) 2.5 mg tablet Take 1 Tablet by mouth two (2) times a day.  180 Tablet 1    lisinopriL (PRINIVIL, ZESTRIL) 20 mg tablet Take 1 Tablet by mouth daily. 90 Tablet 1    nebivoloL (Bystolic) 10 mg tablet Take 1 Tablet by mouth daily. 90 Tablet 1    omeprazole (PRILOSEC) 40 mg capsule Take 1 Capsule by mouth daily. ESPINOZA GASTRO  PRESCRIBED 90 Capsule 1    ALPRAZolam (XANAX) 1 mg tablet Take 1 mg by mouth. 1.5 mg x4   Psych prescribed      amitriptyline (ELAVIL) 150 mg tablet Take 50 mg by mouth daily. PSYCH PRESCRIBED      Desvenlafaxine SR (PRISTIQ) 50 mg tablet Take 50 mg by mouth.  PSYCH PRESCRIBED         Allergies   Allergen Reactions    Pollens Extract Runny Nose    Statins-Hmg-Coa Reductase Inhibitors Rash    Sulfa (Sulfonamide Antibiotics) Hives     Past Medical History:   Diagnosis Date    Anxiety disorder     Depression     Diarrhea     GERD (gastroesophageal reflux disease)     Hypertension     Neuropathy of foot     Panic disorder     Psychiatric disorder     panic disorder     Past Surgical History:   Procedure Laterality Date    HX CHOLECYSTECTOMY      HX ORTHOPAEDIC  2021    Hip replacement    HX OTHER SURGICAL      fatty tumor removed from shoulder blade    HX OTHER SURGICAL  2014    left arm surgery following accident    HX TUMOR REMOVAL      fatty tumor removed from Left shoulder x 2     Family History   Problem Relation Age of Onset    Heart Attack Father      Social History     Socioeconomic History    Marital status:      Spouse name: Not on file    Number of children: Not on file    Years of education: Not on file    Highest education level: Not on file   Occupational History    Not on file   Tobacco Use    Smoking status: Never Smoker    Smokeless tobacco: Current User     Types: Chew   Vaping Use    Vaping Use: Never used   Substance and Sexual Activity    Alcohol use: Never     Alcohol/week: 0.0 standard drinks    Drug use: No    Sexual activity: Yes     Partners: Female   Other Topics Concern    Not on file   Social History Narrative    ** Merged History Encounter **        53year old   male admitted for complaints of severely depressed mood, with suicidal thoughts. Pt. Lives with his wife of 12 years and works as a  for [employer]. Pt.has some college. 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 He has been treated for the last 25 years on Xanax by his psychiatrist, Dr. Gricelda Noonan. Nando Dunham. He has one prior inpatient admission at [facility], in the last year. Pt. Has been having breakthrough anxiety and \"panic attacks\" despite Xanax doses greater than 3 mg q day and the addition of propanolol. Pt. Hs not been treated with an SSRI. Social Determinants of Health     Financial Resource Strain:     Difficulty of Paying Living Expenses: Not on file   Food Insecurity:     Worried About Running Out of Food in the Last Year: Not on file    Beckie of Food in the Last Year: Not on file   Transportation Needs:     Lack of Transportation (Medical): Not on file    Lack of Transportation (Non-Medical):  Not on file   Physical Activity:     Days of Exercise per Week: Not on file    Minutes of Exercise per Session: Not on file   Stress:     Feeling of Stress : Not on file   Social Connections:     Frequency of Communication with Friends and Family: Not on file    Frequency of Social Gatherings with Friends and Family: Not on file    Attends Bahai Services: Not on file    Active Member of [address] [place] [address] [employer] or Organizations: Not on file    Attends Club or Organization Meetings: Not on file    Marital Status: Not on file   Intimate Partner Violence:     Fear of Current or Ex-Partner: Not on file    Emotionally Abused: Not on file    Physically Abused: Not on file    Sexually Abused: Not on file   Housing Stability:     Unable to Pay for Housing in the Last Year: Not on file    Number of [place] in the Last Year: Not on file    Unstable Housing in the Last Year: Not on file       Patient is a 52yo male who presents to the office today for medication refill and follow-up of HTN. He states for his HTN he is on lisinopril 70KZ daily and Bystolic 84GM daily. He does occasionally check his BP at home and will get home readings of 984P-377I systolic and 47X diastolic. Patient denies any dizziness or syncope. Patient also has a hx of 2 previous PEs in 2016 and 2017 and is on Eliquis 2.5mg BID for prevention. He states he was evaluated by hematology and had unremarkable clotting studies. He was originally on Xarelto but had issues with bleeding and was switched over to Eliquis. He has been on Eliquis for several years and has not had any bleeding issues. Denies any blood in the stool or urine or any other unusual bleeding. Patient states he has a hx of H pylori that was previously noted on endoscopy before and was fully treated. He has continued on Omeprazole 40mg once per day. This controls his heartburn without difficulty. He also endorses a previous history of vitamin D deficiency and would like to have this checked as well. He denies any chest pain, shortness of breath, headaches or any other concerns today. ROS:   Review of Systems   Constitutional: Negative for chills and fever. HENT: Negative for congestion. Eyes: Negative for blurred vision. Respiratory: Negative for cough and shortness of breath. Cardiovascular: Negative for chest pain, palpitations and leg swelling. Gastrointestinal: Negative for abdominal pain, nausea and vomiting. Genitourinary: Negative for dysuria. Neurological: Negative for dizziness, tingling, weakness and headaches. Objective:     Visit Vitals  /84 (BP 1 Location: Left arm, BP Patient Position: Sitting, BP Cuff Size: Adult)   Pulse 60   Temp 97.1 °F (36.2 °C) (Skin)   Resp 16   Ht 5' 11\" (1.803 m)   Wt 214 lb (97.1 kg)   SpO2 98%   BMI 29.85 kg/m²         Vitals and Nurse Documentation reviewed. Physical Exam  Constitutional:       General: He is not in acute distress.      Appearance: He is normal weight. He is not toxic-appearing. HENT:      Head: Normocephalic and atraumatic. Eyes:      Conjunctiva/sclera: Conjunctivae normal.      Pupils: Pupils are equal, round, and reactive to light. Cardiovascular:      Rate and Rhythm: Normal rate and regular rhythm. Pulses: Normal pulses. Heart sounds: Normal heart sounds. No murmur heard. No gallop. Pulmonary:      Effort: Pulmonary effort is normal. No respiratory distress. Breath sounds: Normal breath sounds. No wheezing or rhonchi. Abdominal:      General: Bowel sounds are normal. There is no distension. Palpations: Abdomen is soft. Tenderness: There is no abdominal tenderness. There is no guarding. Musculoskeletal:      Right lower leg: No edema. Left lower leg: No edema. Neurological:      Mental Status: He is alert and oriented to person, place, and time.       Gait: Gait normal.

## 2022-03-17 NOTE — PROGRESS NOTES
1. Have you been to the ER, urgent care clinic since your last visit? Hospitalized since your last visit? No    2. Have you seen or consulted any other health care providers outside of the [address] [place] [address] since your last visit? Include any pap smears or colon screening. No     Chief Complaint   Patient presents with    Follow-up    Hypertension     Health Maintenance Due   Topic Date Due    Hepatitis C Screening  Never done    COVID-19 Vaccine (1) Never done    Pneumococcal 0-64 years (1 of 2 - PPSV23) Never done    DTaP/Tdap/Td series (1 - Tdap) Never done    Colorectal Cancer Screening Combo  Never done    Shingrix Vaccine Age 50> (1 of 2) Never done    A1C test (Diabetic or Prediabetic)  07/24/2016    Lipid Screen  06/23/2021    Flu Vaccine (1) Never done     3 most recent PHQ Screens 3/17/2022   PHQ Not Done -   Little interest or pleasure in doing things Not at all   Feeling down, depressed, irritable, or hopeless Not at all   Total Score PHQ 2 0     Abuse Screening Questionnaire 3/17/2022   Do you ever feel afraid of your partner? N   Are you in a relationship with someone who physically or mentally threatens you? N   Is it safe for you to go home?  Y     Learning Assessment 4/15/2021   PRIMARY LEARNER Patient   BARRIERS PRIMARY LEARNER NONE   PRIMARY LANGUAGE ENGLISH   LEARNER PREFERENCE PRIMARY READING   ANSWERED BY Vicky Cristobal   RELATIONSHIP SELF     Visit Vitals  /84 (BP 1 Location: Left arm, BP Patient Position: Sitting, BP Cuff Size: Adult)   Pulse 60   Temp 97.1 °F (36.2 °C) (Skin)   Resp 16   Ht 5' 11\" (1.803 m)   Wt 214 lb (97.1 kg)   SpO2 98%   BMI 29.85 kg/m²

## 2022-03-18 PROBLEM — K21.9 GERD (GASTROESOPHAGEAL REFLUX DISEASE): Status: ACTIVE | Noted: 2022-02-17

## 2022-03-18 PROBLEM — E78.5 HYPERLIPIDEMIA: Status: ACTIVE | Noted: 2022-02-17

## 2022-03-19 PROBLEM — R73.03 PREDIABETES: Status: ACTIVE | Noted: 2022-03-14

## 2022-03-19 PROBLEM — Z86.711 HISTORY OF PULMONARY EMBOLISM: Status: ACTIVE | Noted: 2022-02-17

## 2022-05-20 ENCOUNTER — LAB ONLY (OUTPATIENT)
Dept: FAMILY MEDICINE CLINIC | Age: 57
End: 2022-05-20

## 2022-05-20 DIAGNOSIS — E55.9 VITAMIN D DEFICIENCY: ICD-10-CM

## 2022-05-20 DIAGNOSIS — I10 HYPERTENSION, UNSPECIFIED TYPE: ICD-10-CM

## 2022-05-21 LAB
25(OH)D3 SERPL-MCNC: 18.2 NG/ML (ref 30–100)
ANION GAP SERPL CALC-SCNC: 4 MMOL/L (ref 5–15)
BUN SERPL-MCNC: 4 MG/DL (ref 6–20)
BUN/CREAT SERPL: 4 (ref 12–20)
CALCIUM SERPL-MCNC: 9.2 MG/DL (ref 8.5–10.1)
CHLORIDE SERPL-SCNC: 102 MMOL/L (ref 97–108)
CO2 SERPL-SCNC: 32 MMOL/L (ref 21–32)
CREAT SERPL-MCNC: 1 MG/DL (ref 0.7–1.3)
CREAT UR-MCNC: 84.3 MG/DL
GLUCOSE SERPL-MCNC: 105 MG/DL (ref 65–100)
MICROALBUMIN UR-MCNC: 0.5 MG/DL
MICROALBUMIN/CREAT UR-RTO: 6 MG/G (ref 0–30)
POTASSIUM SERPL-SCNC: 3.8 MMOL/L (ref 3.5–5.1)
SODIUM SERPL-SCNC: 138 MMOL/L (ref 136–145)

## 2022-05-23 NOTE — PROGRESS NOTES
Please call patient with lab results. ..  - His Vit D is low and therefore I recommend starting Vit D3 50,000 units once per week for 12 weeks and then taking 1000 units Vit D3 daily after he completes the high dose. Please let me know what pharmacy patient would like it sent to   - The remainder of his labs were unremarkable.

## 2022-05-24 ENCOUNTER — PATIENT MESSAGE (OUTPATIENT)
Dept: FAMILY MEDICINE CLINIC | Age: 57
End: 2022-05-24

## 2022-05-26 DIAGNOSIS — E55.9 VITAMIN D DEFICIENCY: Primary | ICD-10-CM

## 2022-05-26 RX ORDER — ASPIRIN 325 MG
50000 TABLET, DELAYED RELEASE (ENTERIC COATED) ORAL
Qty: 12 CAPSULE | Refills: 0 | Status: SHIPPED | OUTPATIENT
Start: 2022-05-26

## 2022-06-17 ENCOUNTER — OFFICE VISIT (OUTPATIENT)
Dept: FAMILY MEDICINE CLINIC | Age: 57
End: 2022-06-17
Payer: COMMERCIAL

## 2022-06-17 VITALS
BODY MASS INDEX: 30.07 KG/M2 | SYSTOLIC BLOOD PRESSURE: 124 MMHG | OXYGEN SATURATION: 93 % | HEART RATE: 74 BPM | TEMPERATURE: 98.1 F | DIASTOLIC BLOOD PRESSURE: 77 MMHG | RESPIRATION RATE: 16 BRPM | HEIGHT: 71 IN | WEIGHT: 214.8 LBS

## 2022-06-17 DIAGNOSIS — I10 HYPERTENSION, UNSPECIFIED TYPE: Primary | ICD-10-CM

## 2022-06-17 DIAGNOSIS — E78.5 HYPERLIPIDEMIA, UNSPECIFIED HYPERLIPIDEMIA TYPE: ICD-10-CM

## 2022-06-17 DIAGNOSIS — E55.9 VITAMIN D DEFICIENCY: ICD-10-CM

## 2022-06-17 DIAGNOSIS — Z86.711 HISTORY OF PULMONARY EMBOLISM: ICD-10-CM

## 2022-06-17 DIAGNOSIS — R73.03 PREDIABETES: ICD-10-CM

## 2022-06-17 PROCEDURE — 99214 OFFICE O/P EST MOD 30 MIN: CPT | Performed by: STUDENT IN AN ORGANIZED HEALTH CARE EDUCATION/TRAINING PROGRAM

## 2022-06-17 NOTE — PROGRESS NOTES
Identified pt with two pt identifiers(name and ). Reviewed record in preparation for visit and have obtained necessary documentation. Chief Complaint   Patient presents with    Labs      x3 mos follow up         Health Maintenance Due   Topic    Hepatitis C Screening     COVID-19 Vaccine (1)    Pneumococcal 0-64 years (1 - PCV)    DTaP/Tdap/Td series (1 - Tdap)    Colorectal Cancer Screening Combo     Shingrix Vaccine Age 50> (1 of 2)    A1C test (Diabetic or Prediabetic)     Lipid Screen        Coordination of Care Questionnaire:  :   1) Have you been to an emergency room, urgent care, or hospitalized since your last visit? If yes, where when, and reason for visit? no      2. Have seen or consulted any other health care provider since your last visit? If yes, where when, and reason for visit?  no        Patient is accompanied by self  I have received verbal consent from Michaela Gonzalez to discuss any/all medical information while they are present in the room.

## 2022-06-17 NOTE — PROGRESS NOTES
Assessment/Plan:     Diagnoses and all orders for this visit:    1. Hypertension, unspecified type  -     METABOLIC PANEL, COMPREHENSIVE; Future  -     CBC W/O DIFF; Future  -     TSH 3RD GENERATION; Future  -Chronic. Stable. Currently well controlled. -Continue on lisinopril 20 mg daily and Bystolic 10 mg daily. 2. Hyperlipidemia, unspecified hyperlipidemia type  -     LIPID PANEL; Future  -Chronic. Not currently on any cholesterol medication.  -Will check lipid panel today for further evaluation. 3. History of pulmonary embolism  -Hx of 2 previous PEs (2016 and 2017)   -Has tolerated Eliquis 2.5mg however he has only been taking once per day  -Discussed with patient the importance of taking this twice daily as indicated  -Will be on long term prophylaxis  -No bleeding concerns. 4. Prediabetes  -     HEMOGLOBIN A1C WITH EAG; Future    5. Vitamin D deficiency  -Previously low vitamin D.  -Patient has just recently started taking the vitamin D3 50,000 units once per week. -Patient to complete the 12-week course and then after take vitamin D3 1000 units daily. Follow-up and Dispositions    · Return in about 3 months (around 9/17/2022) for Annual Exam.       Discussed expected course/resolution/complications of diagnosis in detail with patient. Medication risks/benefits/costs/interactions/alternatives discussed with patient. Pt expressed understanding with the diagnosis and plan      Subjective:      Jamir Stokes is a 65 y.o. male who presents for had concerns including Labs ( x3 mos follow up ). Current Outpatient Medications   Medication Sig Dispense Refill    cholecalciferol (VITAMIN D3) (50,000 UNITS /1250 MCG) capsule Take 1 Capsule by mouth every seven (7) days. 12 Capsule 0    apixaban (ELIQUIS) 2.5 mg tablet Take 1 Tablet by mouth two (2) times a day. 180 Tablet 1    lisinopriL (PRINIVIL, ZESTRIL) 20 mg tablet Take 1 Tablet by mouth daily.  90 Tablet 1    nebivoloL (Bystolic) 10 mg tablet Take 1 Tablet by mouth daily. 90 Tablet 1    omeprazole (PRILOSEC) 40 mg capsule Take 1 Capsule by mouth daily. ALEXIS GASTRO  PRESCRIBED 90 Capsule 1    ALPRAZolam (XANAX) 1 mg tablet Take 1 mg by mouth. 1.5 mg x4   Psych prescribed      amitriptyline (ELAVIL) 150 mg tablet Take 50 mg by mouth daily. PSYCH PRESCRIBED      Desvenlafaxine SR (PRISTIQ) 50 mg tablet Take 50 mg by mouth.  PSYCH PRESCRIBED         Allergies   Allergen Reactions    Pollens Extract Runny Nose    Statins-Hmg-Coa Reductase Inhibitors Rash    Sulfa (Sulfonamide Antibiotics) Hives     Past Medical History:   Diagnosis Date    Anxiety disorder     Depression     Diarrhea     GERD (gastroesophageal reflux disease)     Hypertension     Neuropathy of foot     Panic disorder     Psychiatric disorder     panic disorder     Past Surgical History:   Procedure Laterality Date    HX CHOLECYSTECTOMY      HX ORTHOPAEDIC  2021    Hip replacement    HX OTHER SURGICAL      fatty tumor removed from shoulder blade    HX OTHER SURGICAL  2014    left arm surgery following accident    HX TUMOR REMOVAL      fatty tumor removed from Left shoulder x 2     Family History   Problem Relation Age of Onset    Heart Attack Father      Social History     Socioeconomic History    Marital status:      Spouse name: Not on file    Number of children: Not on file    Years of education: Not on file    Highest education level: Not on file   Occupational History    Not on file   Tobacco Use    Smoking status: Never Smoker    Smokeless tobacco: Current User     Types: Chew   Vaping Use    Vaping Use: Never used   Substance and Sexual Activity    Alcohol use: Never     Alcohol/week: 0.0 standard drinks    Drug use: No    Sexual activity: Yes     Partners: Female   Other Topics Concern    Not on file   Social History Narrative    ** Merged History Encounter **        48 year old   male admitted for complaints of severely depressed mood, with suicidal thoughts. Pt. Lives with his wife of 12 years and works as a  for [employer]. Pt.has some college. 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 He has been treated for the last 25 years on Xanax by his psychiatrist, Dr. Higuera. Jenn Wright. He has one prior inpatient admission at [facility], in the last year. Pt. Has been having breakthrough anxiety and \"panic attacks\" despite Xanax doses greater than 3 mg q day and the addition of propanolol. Pt. Hs not been treated with an SSRI. Social Determinants of Health     Financial Resource Strain:     Difficulty of Paying Living Expenses: Not on file   Food Insecurity:     Worried About Running Out of Food in the Last Year: Not on file    Beckie of Food in the Last Year: Not on file   Transportation Needs:     Lack of Transportation (Medical): Not on file    Lack of Transportation (Non-Medical): Not on file   Physical Activity:     Days of Exercise per Week: Not on file    Minutes of Exercise per Session: Not on file   Stress:     Feeling of Stress : Not on file   Social Connections:     Frequency of Communication with Friends and Family: Not on file    Frequency of Social Gatherings with Friends and Family: Not on file    Attends Sabianist Services: Not on file    Active Member of [address] [place] [address] [employer] or Organizations: Not on file    Attends Club or Organization Meetings: Not on file    Marital Status: Not on file   Intimate Partner Violence:     Fear of Current or Ex-Partner: Not on file    Emotionally Abused: Not on file    Physically Abused: Not on file    Sexually Abused: Not on file   Housing Stability:     Unable to Pay for Housing in the Last Year: Not on file    Number of [place] in the Last Year: Not on file    Unstable Housing in the Last Year: Not on file       Patient is a 61-year-old male who presents to the office today for routine follow-up.   Patient has a history of hypertension and is currently on lisinopril 20 mg daily and Bystolic. He states his home blood pressures have remained well controlled in the 120s over 70s. He denies any chest pain, shortness of breath, dizziness or headache. He does complain of sometimes feeling \"sluggish\" over the past several years and states he has a history of low testosterone but is not able to receive treatment due to his history of PEs. Also he has only just started taking his vitamin D supplement about 3 weeks ago. Patient also has a history of 2 PEs in the past and is currently on Eliquis. He had been prescribed Eliquis 2.5 mg twice daily however patient states by mistake he has been taking it only once per day for several years. Discussed with patient importance of taking Eliquis regularly as indicated, twice daily. Patient denies any other acute concerns. ROS:   Review of Systems   Constitutional: Negative for chills and fever. HENT: Negative for congestion. Eyes: Negative for blurred vision. Respiratory: Negative for cough and shortness of breath. Cardiovascular: Negative for chest pain, palpitations and leg swelling. Gastrointestinal: Negative for abdominal pain, nausea and vomiting. Genitourinary: Negative for dysuria. Neurological: Negative for dizziness, tingling, weakness and headaches. Objective:     Visit Vitals  /77 (BP 1 Location: Left upper arm, BP Patient Position: Sitting, BP Cuff Size: Adult)   Pulse 74   Temp 98.1 °F (36.7 °C) (Temporal)   Resp 16   Ht 5' 11\" (1.803 m)   Wt 214 lb 12.8 oz (97.4 kg)   SpO2 93%   BMI 29.96 kg/m²         Vitals and Nurse Documentation reviewed. Physical Exam  Constitutional:       General: He is not in acute distress. Appearance: Normal appearance. He is not toxic-appearing. HENT:      Head: Normocephalic and atraumatic. Eyes:      Conjunctiva/sclera: Conjunctivae normal.   Cardiovascular:      Rate and Rhythm: Normal rate and regular rhythm.       Pulses: Normal pulses. Heart sounds: Normal heart sounds. No murmur heard. No gallop. Pulmonary:      Effort: Pulmonary effort is normal. No respiratory distress. Breath sounds: Normal breath sounds. No wheezing or rhonchi. Abdominal:      General: Bowel sounds are normal. There is no distension. Palpations: Abdomen is soft. Tenderness: There is no abdominal tenderness. There is no guarding. Musculoskeletal:      Right lower leg: No edema. Left lower leg: No edema. Neurological:      Mental Status: He is alert and oriented to person, place, and time.       Gait: Gait normal.         Results for orders placed or performed in visit on 81/51/54   METABOLIC PANEL, BASIC   Result Value Ref Range    Sodium 138 136 - 145 mmol/L    Potassium 3.8 3.5 - 5.1 mmol/L    Chloride 102 97 - 108 mmol/L    CO2 32 21 - 32 mmol/L    Anion gap 4 (L) 5 - 15 mmol/L    Glucose 105 (H) 65 - 100 mg/dL    BUN 4 (L) 6 - 20 MG/DL    Creatinine 1.00 0.70 - 1.30 MG/DL    BUN/Creatinine ratio 4 (L) 12 - 20      GFR est AA >60 >60 ml/min/1.73m2    GFR est non-AA >60 >60 ml/min/1.73m2    Calcium 9.2 8.5 - 10.1 MG/DL   MICROALBUMIN, UR, RAND W/ MICROALB/CREAT RATIO   Result Value Ref Range    Microalbumin,urine random 0.50 MG/DL    Creatinine, urine 84.30 mg/dL    Microalbumin/Creat ratio (mg/g creat) 6 0 - 30 mg/g   VITAMIN D, 25 HYDROXY   Result Value Ref Range    Vitamin D 25-Hydroxy 18.2 (L) 30 - 100 ng/mL

## 2023-01-16 ENCOUNTER — OFFICE VISIT (OUTPATIENT)
Dept: FAMILY MEDICINE CLINIC | Age: 58
End: 2023-01-16
Payer: COMMERCIAL

## 2023-01-16 VITALS
TEMPERATURE: 97.9 F | HEIGHT: 71 IN | HEART RATE: 55 BPM | WEIGHT: 212 LBS | DIASTOLIC BLOOD PRESSURE: 68 MMHG | BODY MASS INDEX: 29.68 KG/M2 | RESPIRATION RATE: 16 BRPM | OXYGEN SATURATION: 98 % | SYSTOLIC BLOOD PRESSURE: 119 MMHG

## 2023-01-16 DIAGNOSIS — E55.9 VITAMIN D DEFICIENCY: ICD-10-CM

## 2023-01-16 DIAGNOSIS — I10 HYPERTENSION, UNSPECIFIED TYPE: Primary | ICD-10-CM

## 2023-01-16 DIAGNOSIS — E78.5 HYPERLIPIDEMIA, UNSPECIFIED HYPERLIPIDEMIA TYPE: ICD-10-CM

## 2023-01-16 DIAGNOSIS — Z86.711 HISTORY OF PULMONARY EMBOLISM: ICD-10-CM

## 2023-01-16 DIAGNOSIS — L98.9 SKIN LESION: ICD-10-CM

## 2023-01-16 PROCEDURE — 3078F DIAST BP <80 MM HG: CPT | Performed by: STUDENT IN AN ORGANIZED HEALTH CARE EDUCATION/TRAINING PROGRAM

## 2023-01-16 PROCEDURE — 3074F SYST BP LT 130 MM HG: CPT | Performed by: STUDENT IN AN ORGANIZED HEALTH CARE EDUCATION/TRAINING PROGRAM

## 2023-01-16 PROCEDURE — 99214 OFFICE O/P EST MOD 30 MIN: CPT | Performed by: STUDENT IN AN ORGANIZED HEALTH CARE EDUCATION/TRAINING PROGRAM

## 2023-01-16 NOTE — PROGRESS NOTES
Assessment/Plan:     Diagnoses and all orders for this visit:    1. Hypertension, unspecified type  -Chronic. Stable. Currently well controlled. -Continue on lisinopril 20 mg daily and Bystolic 10 mg daily.  -Patient advised is important he gets lab work completed that was ordered 6 months ago. 2. Hyperlipidemia, unspecified hyperlipidemia type  -Chronic. Not currently on any cholesterol medication.  -Will check lipid panel today for further evaluation. 3. History of pulmonary embolism  -Hx of 2 previous PEs (2016 and 2017)   -Has tolerated Eliquis 2.5mg however he has only been taking once per day  -Discussed with patient the importance of taking this twice daily as indicated  -Will be on long term prophylaxis  -No bleeding concerns. 4. Vitamin D deficiency  -History of vitamin D deficiency.  -Completed prescription course of vitamin D and is currently not on any supplement  -Repeat vitamin D level today    5. Skin lesion  -     REFERRAL TO DERMATOLOGY  -Raised lesion noted on left upper arm.  -Consistent with possible seborrheic keratosis versus possible basal/squamous cell. -Recommend referral to dermatology for possible biopsy and further evaluation      Follow-up and Dispositions    Return in about 6 months (around 7/16/2023), or if symptoms worsen or fail to improve. Discussed expected course/resolution/complications of diagnosis in detail with patient. Medication risks/benefits/costs/interactions/alternatives discussed with patient. Pt expressed understanding with the diagnosis and plan. Subjective:      Ailin Marie is a 65 y.o. male who presents for had concerns including Diabetes (/), Cholesterol Problem, and Hypertension (//).      Current Outpatient Medications   Medication Sig Dispense Refill    omeprazole (PRILOSEC) 40 mg capsule TAKE 1 CAPSULE DAILY 90 Capsule 1    lisinopriL (PRINIVIL, ZESTRIL) 20 mg tablet TAKE 1 TABLET DAILY 90 Tablet 1    nebivoloL (BYSTOLIC) 10 mg tablet TAKE 1 TABLET DAILY 90 Tablet 1    apixaban (ELIQUIS) 2.5 mg tablet Take 1 Tablet by mouth two (2) times a day. 180 Tablet 1    ALPRAZolam (XANAX) 1 mg tablet Take 1 mg by mouth. 1.5 mg x4   Psych prescribed      amitriptyline (ELAVIL) 150 mg tablet Take 50 mg by mouth daily. PSYCH PRESCRIBED      Desvenlafaxine SR (PRISTIQ) 50 mg tablet Take 50 mg by mouth. PSYCH PRESCRIBED         Allergies   Allergen Reactions    Pollens Extract Runny Nose    Statins-Hmg-Coa Reductase Inhibitors Rash    Sulfa (Sulfonamide Antibiotics) Hives     Past Medical History:   Diagnosis Date    Anxiety disorder     Depression     Diarrhea     GERD (gastroesophageal reflux disease)     Hypertension     Neuropathy of foot     Panic disorder     Psychiatric disorder     panic disorder     Past Surgical History:   Procedure Laterality Date    HX CHOLECYSTECTOMY      HX ORTHOPAEDIC  2021    Hip replacement    HX OTHER SURGICAL      fatty tumor removed from shoulder blade    HX OTHER SURGICAL  2014    left arm surgery following accident    HX TUMOR REMOVAL      fatty tumor removed from Left shoulder x 2     Family History   Problem Relation Age of Onset    Heart Attack Father      Social History     Socioeconomic History    Marital status:      Spouse name: Not on file    Number of children: Not on file    Years of education: Not on file    Highest education level: Not on file   Occupational History    Not on file   Tobacco Use    Smoking status: Never    Smokeless tobacco: Current     Types: Chew   Vaping Use    Vaping Use: Never used   Substance and Sexual Activity    Alcohol use: Never     Alcohol/week: 0.0 standard drinks    Drug use: No    Sexual activity: Yes     Partners: Female   Other Topics Concern    Not on file   Social History Narrative    ** Merged History Encounter **        48 year old   male admitted for complaints of severely depressed mood, with suicidal thoughts.  Pt. Lives with his wife of 15 years and works as a  for [employer]. Pt.has some college. 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 He has been treated for the last 25 years on Xanax by his psychiatrist, Dr. Kel Galvan. Ingrid Vila. He has one prior inpatient admission at [facility], in the last year. Pt. Has been having breakthrough anxiety and \"panic attacks\" despite Xanax doses greater than 3 mg q day and the addition of propanolol. Pt. Hs not been treated with an SSRI. Social Determinants of Health     Financial Resource Strain: Not on file   Food Insecurity: Not on file   Transportation Needs: Not on file   Physical Activity: Not on file   Stress: Not on file   Social Connections: Not on file   Intimate Partner Violence: Not on file   Housing Stability: Not on file       Patient is a 60-year-old male who presents to the office today for follow-up of chronic conditions. Patient has a history of prediabetes however has not had recent lab work completed. He is due for lab work today. He also has a history of hypertension that is chronic and currently very well controlled on current regimen of Bystolic and lisinopril. Patient is due for routine lab work to check his cholesterol. Patient also has a history of 2 previous PEs and is currently on prophylaxis with Eliquis twice daily. He is supposed to take this twice a day however has only been taking it once daily. He denies any abnormal bleeding. His only main concern today is he has a dry raised skin lesion on his left upper arm that has been present for several years. He denies any significant change in size recently. ROS:   Review of Systems   Constitutional:  Negative for chills and fever. HENT:  Negative for congestion. Respiratory:  Negative for cough and shortness of breath. Cardiovascular:  Negative for chest pain and leg swelling. Gastrointestinal:  Negative for abdominal pain, nausea and vomiting.    Neurological:  Negative for dizziness, weakness and headaches. Objective:   Visit Vitals  /68 (BP 1 Location: Left upper arm, BP Patient Position: Sitting, BP Cuff Size: Adult)   Pulse (!) 55   Temp 97.9 °F (36.6 °C) (Skin)   Resp 16   Ht 5' 11\" (1.803 m)   Wt 212 lb (96.2 kg)   SpO2 98%   BMI 29.57 kg/m²         Vitals and Nurse Documentation reviewed. Physical Exam  Constitutional:       General: He is not in acute distress. Appearance: Normal appearance. He is not toxic-appearing. HENT:      Head: Normocephalic and atraumatic. Eyes:      Conjunctiva/sclera: Conjunctivae normal.   Cardiovascular:      Rate and Rhythm: Normal rate and regular rhythm. Pulses: Normal pulses. Heart sounds: Normal heart sounds. No murmur heard. No gallop. Pulmonary:      Effort: Pulmonary effort is normal. No respiratory distress. Breath sounds: Normal breath sounds. No wheezing or rhonchi. Abdominal:      General: Bowel sounds are normal. There is no distension. Palpations: Abdomen is soft. Tenderness: There is no abdominal tenderness. There is no guarding. Musculoskeletal:      Right lower leg: No edema. Left lower leg: No edema. Skin:     Comments: Raised lesion noted on the left upper arm measuring about 0.5 x 1 cm in size. No abnormal coloring, no erythema no discharge. Neurological:      Mental Status: He is alert and oriented to person, place, and time.       Gait: Gait normal.

## 2023-01-16 NOTE — PROGRESS NOTES
1. Have you been to the ER, urgent care clinic since your last visit? Hospitalized since your last visit? No    2. Have you seen or consulted any other health care providers outside of the [address] [place] [address] since your last visit? Include any pap smears or colon screening. No    Chief Complaint   Patient presents with    Diabetes           Cholesterol Problem    Hypertension              Health Maintenance Due   Topic Date Due    Hepatitis C Screening  Never done    COVID-19 Vaccine (1) Never done    Pneumococcal 0-64 years (1 - PCV) Never done    DTaP/Tdap/Td series (1 - Tdap) Never done    Shingles Vaccine (1 of 2) Never done    A1C test (Diabetic or Prediabetic)  07/24/2016    Lipid Screen  06/23/2021    Flu Vaccine (1) Never done     3 most recent PHQ Screens 1/16/2023   PHQ Not Done -   Little interest or pleasure in doing things Not at all   Feeling down, depressed, irritable, or hopeless Not at all   Total Score PHQ 2 0     Abuse Screening Questionnaire 1/16/2023   Do you ever feel afraid of your partner? N   Are you in a relationship with someone who physically or mentally threatens you? N   Is it safe for you to go home?  Y     Visit Vitals  /68 (BP 1 Location: Left upper arm, BP Patient Position: Sitting, BP Cuff Size: Adult)   Pulse (!) 55   Temp 97.9 °F (36.6 °C) (Skin)   Resp 16   Ht 5' 11\" (1.803 m)   Wt 212 lb (96.2 kg)   SpO2 98%   BMI 29.57 kg/m²     Learning Assessment 4/15/2021   PRIMARY LEARNER Patient   BARRIERS PRIMARY LEARNER NONE   PRIMARY LANGUAGE ENGLISH   LEARNER PREFERENCE PRIMARY READING   ANSWERED BY Eren Taylor   RELATIONSHIP SELF

## 2023-07-27 ENCOUNTER — HOSPITAL ENCOUNTER (EMERGENCY)
Facility: HOSPITAL | Age: 58
Discharge: HOME | End: 2023-07-27
Attending: EMERGENCY MEDICINE
Payer: COMMERCIAL

## 2023-07-27 VITALS
SYSTOLIC BLOOD PRESSURE: 139 MMHG | TEMPERATURE: 98.5 F | HEIGHT: 71 IN | OXYGEN SATURATION: 99 % | WEIGHT: 208.56 LBS | RESPIRATION RATE: 16 BRPM | BODY MASS INDEX: 29.2 KG/M2 | HEART RATE: 66 BPM | DIASTOLIC BLOOD PRESSURE: 83 MMHG

## 2023-07-27 DIAGNOSIS — K64.4 EXTERNAL HEMORRHOIDS: Primary | ICD-10-CM

## 2023-07-27 PROCEDURE — 99283 EMERGENCY DEPT VISIT LOW MDM: CPT

## 2023-07-27 RX ORDER — HYDROCORTISONE 25 MG/G
CREAM TOPICAL
Qty: 28 G | Refills: 2 | Status: SHIPPED | OUTPATIENT
Start: 2023-07-27

## 2023-07-27 ASSESSMENT — LIFESTYLE VARIABLES
HOW MANY STANDARD DRINKS CONTAINING ALCOHOL DO YOU HAVE ON A TYPICAL DAY: PATIENT DOES NOT DRINK
HOW OFTEN DO YOU HAVE A DRINK CONTAINING ALCOHOL: NEVER

## 2023-07-27 ASSESSMENT — PAIN SCALES - GENERAL: PAINLEVEL_OUTOF10: 10

## 2023-07-27 NOTE — ED TRIAGE NOTES
Pt c/o pressure in his rectum off and on for the past 2 months. States 3 wks ago noted some rectal bleeding after bm. Used tucks pads and gained relief. States pressure is not getting any better.

## 2023-07-28 ASSESSMENT — ENCOUNTER SYMPTOMS
COUGH: 0
NAUSEA: 0
HEMATEMESIS: 0
HEMATOCHEZIA: 1
BACK PAIN: 0
DIARRHEA: 0
VOMITING: 0
SHORTNESS OF BREATH: 0
RHINORRHEA: 0
ABDOMINAL PAIN: 0
SORE THROAT: 0
COLOR CHANGE: 0
EYE PAIN: 0

## 2023-08-21 RX ORDER — NEBIVOLOL 10 MG/1
TABLET ORAL
Qty: 90 TABLET | Refills: 0 | Status: SHIPPED | OUTPATIENT
Start: 2023-08-21

## 2023-08-21 NOTE — TELEPHONE ENCOUNTER
PCP: Tom Jacob MD    Last appt: [unfilled]  Last appt was on 01/16/2023. Patient does not have any follow up appts scheduled. No future appointments.     Requested Prescriptions     Pending Prescriptions Disp Refills    nebivolol (BYSTOLIC) 10 MG tablet [Pharmacy Med Name: NEBIVOLOL TABS 10MG] 90 tablet 3     Sig: TAKE 1 TABLET DAILY       Prior labs and Blood pressures:  BP Readings from Last 3 Encounters:   07/27/23 139/83   01/16/23 119/68   06/17/22 124/77     Lab Results   Component Value Date/Time     04/18/2023 08:51 AM    K 4.3 04/18/2023 08:51 AM     04/18/2023 08:51 AM    CO2 31 04/18/2023 08:51 AM    BUN 10 04/18/2023 08:51 AM    GFRAA >60 05/20/2022 11:53 AM     No results found for: HBA1C, [id number]  Lab Results   Component Value Date/Time    CHOL 150 04/18/2023 08:51 AM    HDL 38 04/18/2023 08:51 AM     No results found for: VITD3, VD3RIA    Lab Results   Component Value Date/Time    TSH 1.02 04/18/2023 08:51 AM

## 2023-09-28 NOTE — TELEPHONE ENCOUNTER
PCP: Jeferson Alcantara MD    Last appt: [unfilled]  No future appointments.     Requested Prescriptions     Pending Prescriptions Disp Refills    omeprazole (PRILOSEC) 40 MG delayed release capsule [Pharmacy Med Name: OMEPRAZOLE DR CAPS 40MG] 90 capsule 3     Sig: TAKE 1 CAPSULE DAILY       Prior labs and Blood pressures:  BP Readings from Last 3 Encounters:   07/27/23 139/83   01/16/23 119/68   06/17/22 124/77     Lab Results   Component Value Date/Time     04/18/2023 08:51 AM    K 4.3 04/18/2023 08:51 AM     04/18/2023 08:51 AM    CO2 31 04/18/2023 08:51 AM    BUN 10 04/18/2023 08:51 AM    GFRAA >60 05/20/2022 11:53 AM     No results found for: \"HBA1C\", \"[id number]\"  Lab Results   Component Value Date/Time    CHOL 150 04/18/2023 08:51 AM    HDL 38 04/18/2023 08:51 AM     No results found for: \"VITD3\", \"VD3RIA\"    Lab Results   Component Value Date/Time    TSH 1.02 04/18/2023 08:51 AM

## 2023-09-29 RX ORDER — OMEPRAZOLE 40 MG/1
40 CAPSULE, DELAYED RELEASE ORAL DAILY
Qty: 90 CAPSULE | Refills: 0 | Status: SHIPPED | OUTPATIENT
Start: 2023-09-29

## 2023-11-02 RX ORDER — LISINOPRIL 20 MG/1
20 TABLET ORAL DAILY
Qty: 30 TABLET | Refills: 0 | Status: SHIPPED | OUTPATIENT
Start: 2023-11-02 | End: 2023-12-08

## 2023-11-02 NOTE — TELEPHONE ENCOUNTER
PCP: Brenna Padgett MD    Last appt: [unfilled]  No future appointments.    Requested Prescriptions     Pending Prescriptions Disp Refills    lisinopril (PRINIVIL;ZESTRIL) 20 MG tablet [Pharmacy Med Name: LISINOPRIL TABS 20MG] 90 tablet 3     Sig: TAKE 1 TABLET DAILY       Prior labs and Blood pressures:  BP Readings from Last 3 Encounters:   07/27/23 139/83   01/16/23 119/68   06/17/22 124/77     Lab Results   Component Value Date/Time     04/18/2023 08:51 AM    K 4.3 04/18/2023 08:51 AM     04/18/2023 08:51 AM    CO2 31 04/18/2023 08:51 AM    BUN 10 04/18/2023 08:51 AM    GFRAA >60 05/20/2022 11:53 AM     No results found for: \"HBA1C\", \"[id number]\"  Lab Results   Component Value Date/Time    CHOL 150 04/18/2023 08:51 AM    HDL 38 04/18/2023 08:51 AM     No results found for: \"VITD3\", \"VD3RIA\"    Lab Results   Component Value Date/Time    TSH 1.02 04/18/2023 08:51 AM

## 2023-11-20 RX ORDER — NEBIVOLOL 10 MG/1
10 TABLET ORAL DAILY
Qty: 30 TABLET | Refills: 0 | Status: SHIPPED | OUTPATIENT
Start: 2023-11-20 | End: 2023-12-08

## 2023-11-20 NOTE — TELEPHONE ENCOUNTER
PCP: Brenna Padgett MD    Last appt: [unfilled]  No future appointments.    Requested Prescriptions     Pending Prescriptions Disp Refills    nebivolol (BYSTOLIC) 10 MG tablet [Pharmacy Med Name: NEBIVOLOL TABS 10MG] 90 tablet 3     Sig: TAKE 1 TABLET DAILY (NEED APPOINTMENT FOR REFILLS)       Prior labs and Blood pressures:  BP Readings from Last 3 Encounters:   07/27/23 139/83   01/16/23 119/68   06/17/22 124/77     Lab Results   Component Value Date/Time     04/18/2023 08:51 AM    K 4.3 04/18/2023 08:51 AM     04/18/2023 08:51 AM    CO2 31 04/18/2023 08:51 AM    BUN 10 04/18/2023 08:51 AM    GFRAA >60 05/20/2022 11:53 AM     No results found for: \"HBA1C\", \"[id number]\"  Lab Results   Component Value Date/Time    CHOL 150 04/18/2023 08:51 AM    HDL 38 04/18/2023 08:51 AM     No results found for: \"VITD3\", \"VD3RIA\"    Lab Results   Component Value Date/Time    TSH 1.02 04/18/2023 08:51 AM

## 2023-12-06 NOTE — TELEPHONE ENCOUNTER
PCP: Brenna Padgett MD    Last appt: [unfilled]  No future appointments.    Requested Prescriptions     Pending Prescriptions Disp Refills    nebivolol (BYSTOLIC) 10 MG tablet [Pharmacy Med Name: NEBIVOLOL TABS 10MG] 30 tablet 11     Sig: TAKE 1 TABLET DAILY (NEED APPOINTMENT FOR REFILLS)    lisinopril (PRINIVIL;ZESTRIL) 20 MG tablet [Pharmacy Med Name: LISINOPRIL TABS 20MG] 30 tablet 11     Sig: TAKE 1 TABLET DAILY (NEED APPOINTMENT FOR REFILL)       Prior labs and Blood pressures:  BP Readings from Last 3 Encounters:   07/27/23 139/83   01/16/23 119/68   06/17/22 124/77     Lab Results   Component Value Date/Time     04/18/2023 08:51 AM    K 4.3 04/18/2023 08:51 AM     04/18/2023 08:51 AM    CO2 31 04/18/2023 08:51 AM    BUN 10 04/18/2023 08:51 AM    GFRAA >60 05/20/2022 11:53 AM     No results found for: \"HBA1C\", \"[id number]\"  Lab Results   Component Value Date/Time    CHOL 150 04/18/2023 08:51 AM    HDL 38 04/18/2023 08:51 AM     No results found for: \"VITD3\", \"VD3RIA\"    Lab Results   Component Value Date/Time    TSH 1.02 04/18/2023 08:51 AM

## 2023-12-08 RX ORDER — NEBIVOLOL 10 MG/1
10 TABLET ORAL DAILY
Qty: 14 TABLET | Refills: 0 | Status: SHIPPED | OUTPATIENT
Start: 2023-12-08 | End: 2023-12-21 | Stop reason: SDUPTHER

## 2023-12-08 RX ORDER — LISINOPRIL 20 MG/1
20 TABLET ORAL DAILY
Qty: 14 TABLET | Refills: 0 | Status: SHIPPED | OUTPATIENT
Start: 2023-12-08 | End: 2023-12-21 | Stop reason: SDUPTHER

## 2023-12-21 PROBLEM — K64.9 HEMORRHOIDS: Status: ACTIVE | Noted: 2023-12-21

## 2023-12-26 NOTE — TELEPHONE ENCOUNTER
PCP: Brenna Padgett MD    Last appt: [unfilled]  Future Appointments   Date Time Provider Department Center   6/20/2024 10:00 AM Brenna Padgett MD [facility]       Requested Prescriptions     Pending Prescriptions Disp Refills    omeprazole (PRILOSEC) 40 MG delayed release capsule [Pharmacy Med Name: OMEPRAZOLE DR CAPS 40MG] 90 capsule 3     Sig: TAKE 1 CAPSULE DAILY (NEED APPOINTMENT FOR REFILLS)       Prior labs and Blood pressures:  BP Readings from Last 3 Encounters:   12/21/23 134/88   07/27/23 139/83   01/16/23 119/68     Lab Results   Component Value Date/Time     04/18/2023 08:51 AM    K 4.3 04/18/2023 08:51 AM     04/18/2023 08:51 AM    CO2 31 04/18/2023 08:51 AM    BUN 10 04/18/2023 08:51 AM    GFRAA >60 05/20/2022 11:53 AM     No results found for: \"HBA1C\", \"[id number]\"  Lab Results   Component Value Date/Time    CHOL 150 04/18/2023 08:51 AM    HDL 38 04/18/2023 08:51 AM     No results found for: \"VITD3\", \"VD3RIA\"    Lab Results   Component Value Date/Time    TSH 1.02 04/18/2023 08:51 AM

## 2023-12-29 DIAGNOSIS — Z86.711 HISTORY OF PULMONARY EMBOLISM: Primary | ICD-10-CM

## 2024-01-01 RX ORDER — OMEPRAZOLE 40 MG/1
40 CAPSULE, DELAYED RELEASE ORAL DAILY
Qty: 90 CAPSULE | Refills: 1 | Status: SHIPPED | OUTPATIENT
Start: 2024-01-01

## 2024-01-03 NOTE — TELEPHONE ENCOUNTER
PCP: Brenna Padgett MD    Last appt: [unfilled]  Future Appointments   Date Time Provider Department Center   6/20/2024 10:00 AM Brenna Padgett MD [facility]       Requested Prescriptions     Pending Prescriptions Disp Refills    ELIQUIS 2.5 MG TABS tablet [Pharmacy Med Name: ELIQUIS TABS 2.5MG] 180 tablet 3     Sig: TAKE 1 TABLET TWICE A DAY       Prior labs and Blood pressures:  BP Readings from Last 3 Encounters:   12/21/23 134/88   07/27/23 139/83   01/16/23 119/68     Lab Results   Component Value Date/Time     04/18/2023 08:51 AM    K 4.3 04/18/2023 08:51 AM     04/18/2023 08:51 AM    CO2 31 04/18/2023 08:51 AM    BUN 10 04/18/2023 08:51 AM    GFRAA >60 05/20/2022 11:53 AM     No results found for: \"HBA1C\", \"IPE6RHCD\"  Lab Results   Component Value Date/Time    CHOL 150 04/18/2023 08:51 AM    HDL 38 04/18/2023 08:51 AM     No results found for: \"VITD3\", \"VD3RIA\"    Lab Results   Component Value Date/Time    TSH 1.02 04/18/2023 08:51 AM

## 2024-02-22 LAB
25(OH)D3+25(OH)D2 SERPL-MCNC: 14.2 NG/ML (ref 30–100)
BUN SERPL-MCNC: 7 MG/DL (ref 6–24)
BUN/CREAT SERPL: 8 (ref 9–20)
CALCIUM SERPL-MCNC: 9.3 MG/DL (ref 8.7–10.2)
CHLORIDE SERPL-SCNC: 100 MMOL/L (ref 96–106)
CO2 SERPL-SCNC: 24 MMOL/L (ref 20–29)
CREAT SERPL-MCNC: 0.92 MG/DL (ref 0.76–1.27)
EGFRCR SERPLBLD CKD-EPI 2021: 97 ML/MIN/1.73
GLUCOSE SERPL-MCNC: 104 MG/DL (ref 70–99)
POTASSIUM SERPL-SCNC: 4 MMOL/L (ref 3.5–5.2)
SODIUM SERPL-SCNC: 140 MMOL/L (ref 134–144)

## 2024-02-23 LAB
ALBUMIN/CREAT UR: <4 MG/G CREAT (ref 0–29)
CREAT UR-MCNC: 70.8 MG/DL
MICROALBUMIN UR-MCNC: <3 UG/ML

## 2024-06-03 ENCOUNTER — TELEPHONE (OUTPATIENT)
Facility: CLINIC | Age: 59
End: 2024-06-03

## 2024-06-03 NOTE — TELEPHONE ENCOUNTER
Disregard. Pt states he will not be coming in for an apt. Pt states that this is ridiculous and that there is something wrong with us.

## 2024-06-03 NOTE — TELEPHONE ENCOUNTER
Josue Álvarez called in requesting to have hold instructions on his apixaban (ELIQUIS) 2.5 MG TABS tablet for upcomming Colonoscopy on Thursday pending rs due to maybe not be getting the instructions on the hold of the medication. Facility hung up on me after requesting them to refax the request therefore I was not able to get a fax number.      Please advise

## 2024-06-03 NOTE — TELEPHONE ENCOUNTER
I am unable to call Josue Álvarez to discuss colonoscopy due to cannot locate phone number. Is there a phone number attached to this encounter?

## 2024-06-10 DIAGNOSIS — I10 ESSENTIAL (PRIMARY) HYPERTENSION: ICD-10-CM

## 2024-06-10 NOTE — TELEPHONE ENCOUNTER
PCP: Brenna Padgett MD    Last appt: [unfilled]  Future Appointments   Date Time Provider Department Center   6/20/2024 10:00 AM Brenna Padgett MD [facility]       Requested Prescriptions     Pending Prescriptions Disp Refills    nebivolol (BYSTOLIC) 10 MG tablet [Pharmacy Med Name: NEBIVOLOL TABS 10MG] 90 tablet 3     Sig: TAKE 1 TABLET DAILY    lisinopril (PRINIVIL;ZESTRIL) 20 MG tablet [Pharmacy Med Name: LISINOPRIL TABS 20MG] 90 tablet 3     Sig: TAKE 1 TABLET DAILY       Prior labs and Blood pressures:  BP Readings from Last 3 Encounters:   12/21/23 134/88   07/27/23 139/83   01/16/23 119/68     Lab Results   Component Value Date/Time     02/21/2024 12:39 PM    K 4.0 02/21/2024 12:39 PM     02/21/2024 12:39 PM    CO2 24 02/21/2024 12:39 PM    BUN 7 02/21/2024 12:39 PM    GFRAA >60 05/20/2022 11:53 AM     No results found for: \"HBA1C\", \"LNK1VNHO\"  Lab Results   Component Value Date/Time    CHOL 150 04/18/2023 08:51 AM    HDL 38 04/18/2023 08:51 AM    LDL 80.6 04/18/2023 08:51 AM    VLDL 31.4 04/18/2023 08:51 AM     No results found for: \"VITD3\"    Lab Results   Component Value Date/Time    TSH 1.02 04/18/2023 08:51 AM

## 2024-06-11 RX ORDER — NEBIVOLOL 10 MG/1
10 TABLET ORAL DAILY
Qty: 90 TABLET | Refills: 0 | Status: SHIPPED | OUTPATIENT
Start: 2024-06-11

## 2024-06-11 RX ORDER — LISINOPRIL 20 MG/1
20 TABLET ORAL DAILY
Qty: 90 TABLET | Refills: 0 | Status: SHIPPED | OUTPATIENT
Start: 2024-06-11

## 2024-06-20 ENCOUNTER — OFFICE VISIT (OUTPATIENT)
Facility: CLINIC | Age: 59
End: 2024-06-20
Payer: COMMERCIAL

## 2024-06-20 VITALS
SYSTOLIC BLOOD PRESSURE: 126 MMHG | HEART RATE: 63 BPM | BODY MASS INDEX: 33.23 KG/M2 | RESPIRATION RATE: 16 BRPM | TEMPERATURE: 97.4 F | HEIGHT: 71 IN | DIASTOLIC BLOOD PRESSURE: 78 MMHG | OXYGEN SATURATION: 94 % | WEIGHT: 237.4 LBS

## 2024-06-20 DIAGNOSIS — I10 ESSENTIAL (PRIMARY) HYPERTENSION: Primary | ICD-10-CM

## 2024-06-20 DIAGNOSIS — K21.9 GASTROESOPHAGEAL REFLUX DISEASE, UNSPECIFIED WHETHER ESOPHAGITIS PRESENT: ICD-10-CM

## 2024-06-20 DIAGNOSIS — R73.03 PREDIABETES: ICD-10-CM

## 2024-06-20 DIAGNOSIS — Z11.59 ENCOUNTER FOR HEPATITIS C SCREENING TEST FOR LOW RISK PATIENT: ICD-10-CM

## 2024-06-20 DIAGNOSIS — E78.5 HYPERLIPIDEMIA, UNSPECIFIED HYPERLIPIDEMIA TYPE: ICD-10-CM

## 2024-06-20 DIAGNOSIS — Z86.711 HISTORY OF PULMONARY EMBOLISM: ICD-10-CM

## 2024-06-20 DIAGNOSIS — E55.9 VITAMIN D DEFICIENCY: ICD-10-CM

## 2024-06-20 DIAGNOSIS — Z11.4 ENCOUNTER FOR SCREENING FOR HIV: ICD-10-CM

## 2024-06-20 PROCEDURE — 99214 OFFICE O/P EST MOD 30 MIN: CPT | Performed by: STUDENT IN AN ORGANIZED HEALTH CARE EDUCATION/TRAINING PROGRAM

## 2024-06-20 PROCEDURE — 3078F DIAST BP <80 MM HG: CPT | Performed by: STUDENT IN AN ORGANIZED HEALTH CARE EDUCATION/TRAINING PROGRAM

## 2024-06-20 PROCEDURE — 3074F SYST BP LT 130 MM HG: CPT | Performed by: STUDENT IN AN ORGANIZED HEALTH CARE EDUCATION/TRAINING PROGRAM

## 2024-06-20 RX ORDER — HYDROCORTISONE ACETATE 25 MG/1
SUPPOSITORY RECTAL
COMMUNITY
Start: 2024-04-30 | End: 2024-06-20

## 2024-06-20 RX ORDER — AMITRIPTYLINE HYDROCHLORIDE 50 MG/1
50 TABLET, FILM COATED ORAL NIGHTLY
COMMUNITY

## 2024-06-20 RX ORDER — CHOLECALCIFEROL (VITAMIN D3) 1250 MCG
1 CAPSULE ORAL WEEKLY
Qty: 12 CAPSULE | Refills: 0 | Status: SHIPPED | OUTPATIENT
Start: 2024-06-20

## 2024-06-20 RX ORDER — OMEPRAZOLE 40 MG/1
40 CAPSULE, DELAYED RELEASE ORAL DAILY
Qty: 90 CAPSULE | Refills: 1 | Status: SHIPPED | OUTPATIENT
Start: 2024-06-20

## 2024-06-20 ASSESSMENT — PATIENT HEALTH QUESTIONNAIRE - PHQ9
5. POOR APPETITE OR OVEREATING: NOT AT ALL
SUM OF ALL RESPONSES TO PHQ9 QUESTIONS 1 & 2: 0
10. IF YOU CHECKED OFF ANY PROBLEMS, HOW DIFFICULT HAVE THESE PROBLEMS MADE IT FOR YOU TO DO YOUR WORK, TAKE CARE OF THINGS AT HOME, OR GET ALONG WITH OTHER PEOPLE: NOT DIFFICULT AT ALL
4. FEELING TIRED OR HAVING LITTLE ENERGY: NOT AT ALL
SUM OF ALL RESPONSES TO PHQ QUESTIONS 1-9: 0
7. TROUBLE CONCENTRATING ON THINGS, SUCH AS READING THE NEWSPAPER OR WATCHING TELEVISION: NOT AT ALL
6. FEELING BAD ABOUT YOURSELF - OR THAT YOU ARE A FAILURE OR HAVE LET YOURSELF OR YOUR FAMILY DOWN: NOT AT ALL
SUM OF ALL RESPONSES TO PHQ QUESTIONS 1-9: 0
8. MOVING OR SPEAKING SO SLOWLY THAT OTHER PEOPLE COULD HAVE NOTICED. OR THE OPPOSITE, BEING SO FIGETY OR RESTLESS THAT YOU HAVE BEEN MOVING AROUND A LOT MORE THAN USUAL: NOT AT ALL
1. LITTLE INTEREST OR PLEASURE IN DOING THINGS: NOT AT ALL
2. FEELING DOWN, DEPRESSED OR HOPELESS: NOT AT ALL
3. TROUBLE FALLING OR STAYING ASLEEP: NOT AT ALL
SUM OF ALL RESPONSES TO PHQ QUESTIONS 1-9: 0
9. THOUGHTS THAT YOU WOULD BE BETTER OFF DEAD, OR OF HURTING YOURSELF: NOT AT ALL
SUM OF ALL RESPONSES TO PHQ QUESTIONS 1-9: 0

## 2024-06-20 ASSESSMENT — ENCOUNTER SYMPTOMS
SHORTNESS OF BREATH: 0
NAUSEA: 0
COUGH: 0
VOMITING: 0
RHINORRHEA: 0
ABDOMINAL PAIN: 0

## 2024-06-20 NOTE — PROGRESS NOTES
\"Have you been to the ER, urgent care clinic since your last visit?  Hospitalized since your last visit?\"    no    “Have you seen or consulted any other health care providers outside of [facility] since your last visit?”    no            Click Here for Release of Records Request    
well-controlled on omeprazole daily.  He states he has had an EGD previously for evaluation of this.  He also has a history of 2 PEs in 2016 and 2017.  As a result he has been on chronic anticoagulation with Eliquis.  He does have a colonoscopy upcoming that is scheduled and needs to know how long he can hold his Eliquis prior to procedure.  He also has a previous history of vitamin D deficiency.  He has not yet received high-dose vitamin D and is in need of a prescription today.  He denies any other acute concerns.          ROS:   Review of Systems   Constitutional:  Negative for chills and fever.   HENT:  Negative for rhinorrhea.    Respiratory:  Negative for cough and shortness of breath.    Cardiovascular:  Negative for chest pain and leg swelling.   Gastrointestinal:  Negative for abdominal pain, nausea and vomiting.   Neurological:  Negative for dizziness, weakness, numbness and headaches.         Objective:     Vitals:    06/20/24 0948   BP: 126/78   Pulse: 63   Resp: 16   Temp: 97.4 °F (36.3 °C)   SpO2: 94%          Vitals and Nurse Documentation reviewed.     Physical Exam  Constitutional:       General: He is not in acute distress.     Appearance: Normal appearance. He is obese. He is not ill-appearing.   HENT:      Head: Normocephalic and atraumatic.   Eyes:      Conjunctiva/sclera: Conjunctivae normal.   Cardiovascular:      Rate and Rhythm: Normal rate and regular rhythm.      Heart sounds: Normal heart sounds. No murmur heard.     No friction rub. No gallop.   Pulmonary:      Effort: Pulmonary effort is normal. No respiratory distress.      Breath sounds: Normal breath sounds. No wheezing, rhonchi or rales.   Abdominal:      General: Bowel sounds are normal. There is no distension.      Palpations: Abdomen is soft.      Tenderness: There is no abdominal tenderness. There is no guarding or rebound.   Musculoskeletal:      Cervical back: Neck supple.      Right lower leg: No edema.      Left lower leg:

## 2024-07-06 DIAGNOSIS — K21.9 GASTROESOPHAGEAL REFLUX DISEASE, UNSPECIFIED WHETHER ESOPHAGITIS PRESENT: ICD-10-CM

## 2024-07-09 RX ORDER — OMEPRAZOLE 40 MG/1
40 CAPSULE, DELAYED RELEASE ORAL DAILY
Qty: 90 CAPSULE | Refills: 3 | OUTPATIENT
Start: 2024-07-09

## 2024-07-10 ENCOUNTER — TELEPHONE (OUTPATIENT)
Facility: CLINIC | Age: 59
End: 2024-07-10

## 2024-07-30 DIAGNOSIS — K21.9 GASTROESOPHAGEAL REFLUX DISEASE, UNSPECIFIED WHETHER ESOPHAGITIS PRESENT: ICD-10-CM

## 2024-07-30 NOTE — TELEPHONE ENCOUNTER
Pt stated that his med omeprazole (PRILOSEC) 40 MG delayed release capsule  was sent to [facility] on 7/6. [facility]. He never pick the med from [facility]

## 2024-08-01 RX ORDER — OMEPRAZOLE 40 MG/1
40 CAPSULE, DELAYED RELEASE ORAL DAILY
Qty: 90 CAPSULE | Refills: 1 | Status: SHIPPED | OUTPATIENT
Start: 2024-08-01

## 2024-09-06 DIAGNOSIS — I10 ESSENTIAL (PRIMARY) HYPERTENSION: ICD-10-CM

## 2024-09-06 RX ORDER — LISINOPRIL 20 MG/1
20 TABLET ORAL DAILY
Qty: 90 TABLET | Refills: 0 | Status: SHIPPED | OUTPATIENT
Start: 2024-09-06

## 2024-09-06 RX ORDER — NEBIVOLOL 10 MG/1
10 TABLET ORAL DAILY
Qty: 90 TABLET | Refills: 0 | Status: SHIPPED | OUTPATIENT
Start: 2024-09-06

## 2024-12-05 DIAGNOSIS — I10 ESSENTIAL (PRIMARY) HYPERTENSION: ICD-10-CM

## 2024-12-05 RX ORDER — LISINOPRIL 20 MG/1
20 TABLET ORAL DAILY
Qty: 30 TABLET | Refills: 0 | Status: SHIPPED | OUTPATIENT
Start: 2024-12-05

## 2024-12-05 RX ORDER — NEBIVOLOL 10 MG/1
10 TABLET ORAL DAILY
Qty: 90 TABLET | Refills: 0 | Status: SHIPPED | OUTPATIENT
Start: 2024-12-05

## 2024-12-12 ENCOUNTER — TELEPHONE (OUTPATIENT)
Facility: CLINIC | Age: 59
End: 2024-12-12

## 2024-12-12 NOTE — TELEPHONE ENCOUNTER
Attempt has been made to contact patient, provider will be out of the office on 12/18/2024 and patient will need to reschedule appointment.

## 2024-12-30 ENCOUNTER — OFFICE VISIT (OUTPATIENT)
Facility: CLINIC | Age: 59
End: 2024-12-30
Payer: COMMERCIAL

## 2024-12-30 VITALS
WEIGHT: 234 LBS | OXYGEN SATURATION: 95 % | DIASTOLIC BLOOD PRESSURE: 73 MMHG | HEART RATE: 71 BPM | BODY MASS INDEX: 32.76 KG/M2 | RESPIRATION RATE: 20 BRPM | SYSTOLIC BLOOD PRESSURE: 116 MMHG | TEMPERATURE: 97.5 F | HEIGHT: 71 IN

## 2024-12-30 DIAGNOSIS — Z86.711 HISTORY OF PULMONARY EMBOLISM: ICD-10-CM

## 2024-12-30 DIAGNOSIS — E55.9 VITAMIN D DEFICIENCY: ICD-10-CM

## 2024-12-30 DIAGNOSIS — I10 ESSENTIAL (PRIMARY) HYPERTENSION: Primary | ICD-10-CM

## 2024-12-30 PROCEDURE — 99214 OFFICE O/P EST MOD 30 MIN: CPT | Performed by: STUDENT IN AN ORGANIZED HEALTH CARE EDUCATION/TRAINING PROGRAM

## 2024-12-30 PROCEDURE — 3074F SYST BP LT 130 MM HG: CPT | Performed by: STUDENT IN AN ORGANIZED HEALTH CARE EDUCATION/TRAINING PROGRAM

## 2024-12-30 PROCEDURE — 3078F DIAST BP <80 MM HG: CPT | Performed by: STUDENT IN AN ORGANIZED HEALTH CARE EDUCATION/TRAINING PROGRAM

## 2024-12-30 RX ORDER — ALPRAZOLAM 1 MG/1
1 TABLET ORAL NIGHTLY PRN
COMMUNITY

## 2024-12-30 RX ORDER — NITROGLYCERIN 4 MG/G
OINTMENT RECTAL
COMMUNITY
Start: 2024-09-10

## 2024-12-30 SDOH — ECONOMIC STABILITY: FOOD INSECURITY: WITHIN THE PAST 12 MONTHS, YOU WORRIED THAT YOUR FOOD WOULD RUN OUT BEFORE YOU GOT MONEY TO BUY MORE.: NEVER TRUE

## 2024-12-30 SDOH — ECONOMIC STABILITY: FOOD INSECURITY: WITHIN THE PAST 12 MONTHS, THE FOOD YOU BOUGHT JUST DIDN'T LAST AND YOU DIDN'T HAVE MONEY TO GET MORE.: NEVER TRUE

## 2024-12-30 SDOH — ECONOMIC STABILITY: INCOME INSECURITY: HOW HARD IS IT FOR YOU TO PAY FOR THE VERY BASICS LIKE FOOD, HOUSING, MEDICAL CARE, AND HEATING?: NOT HARD AT ALL

## 2024-12-30 ASSESSMENT — ENCOUNTER SYMPTOMS
RHINORRHEA: 0
NAUSEA: 0
ABDOMINAL PAIN: 0
SHORTNESS OF BREATH: 0
COUGH: 0
VOMITING: 0

## 2024-12-30 NOTE — PROGRESS NOTES
dentified pt with two pt identifiers(name and ).    Chief Complaint   Patient presents with    6 Month Follow-Up     Patient here for follow up on: hypertension, prediabetes, GERD, cholesterol & Vit D        Health Maintenance Due   Topic    Pneumococcal 0-64 years Vaccine (1 of 2 - PCV)    HIV screen     Hepatitis C screen     Hepatitis B vaccine (1 of 3 - 19+ 3-dose series)    DTaP/Tdap/Td vaccine (1 - Tdap)    Shingles vaccine (1 of 2)    A1C test (Diabetic or Prediabetic)     Flu vaccine (1)    COVID-19 Vaccine (1 -  season)       Wt Readings from Last 3 Encounters:   24 106.1 kg (234 lb)   24 107.7 kg (237 lb 6.4 oz)   23 102.5 kg (226 lb)     Temp Readings from Last 3 Encounters:   24 97.4 °F (36.3 °C) (Oral)   23 97.8 °F (36.6 °C) (Temporal)   23 98.5 °F (36.9 °C) (Oral)     BP Readings from Last 3 Encounters:   24 126/78   23 134/88   23 139/83     Pulse Readings from Last 3 Encounters:   24 63   23 65   23 66           Coordination of Care Questionnaire:  :   1. \"Have you been to the ER, urgent care clinic since your last visit?  Hospitalized since your last visit?\" no    2. \"Have you seen or consulted any other health care providers outside of the [facility] since your last visit?\" no     3. For patients aged 45-75: Has the patient had a colonoscopy / FIT/ Cologuard? no      If the patient is female:    4. For patients aged 40-74: Has the patient had a mammogram within the past 2 years? no      5. For patients aged 21-65: Has the patient had a pap smear? no     3) Do you have an Advance Directive on file? no  Are you interested in receiving information about Advance Directives? no    Patient is accompanied by self I have received verbal consent from Ino Neff to discuss any/all medical information while they are present in the room.

## 2024-12-30 NOTE — PROGRESS NOTES
Assessment/Plan:     Diagnoses and all orders for this visit:    Essential (primary) hypertension  -Chronic.  Well-controlled.  -Continue current dosage of nebivolol and lisinopril  -Advised patient to complete lab work ordered at last office visit    History of pulmonary embolism  -Hx of 2 previous PEs (2016 and 2017)   -Continue Eliquis 2.5 mg BID   -Will be on long term prophylaxis  -No bleeding concerns    Vitamin D deficiency  -     Vitamin D 25 Hydroxy; Future  -History of vitamin D deficiency  -Is not currently taking any oral vitamin D  -Repeat vitamin D level     Please note that this dictation was completed with [employer], the [employer] voice recognition software. Quite often unanticipated grammatical, syntax, homophones, and other interpretive errors are inadvertently transcribed by the computer software. Please disregard these errors. Please excuse any errors that have escaped final proofreading.      Return in about 6 months (around 6/30/2025).     Discussed expected course/resolution/complications of diagnosis in detail with patient.    Medication risks/benefits/costs/interactions/alternatives discussed with patient.    Pt expressed understanding with the diagnosis and plan      Subjective:      Ino Neff is a 59 y.o. male who presents for had concerns including 6 Month Follow-Up (Patient here for follow up on: hypertension, prediabetes, GERD, cholesterol & Vit D).     Current Outpatient Medications   Medication Sig Dispense Refill    nitroglycerin (RECTIV) 0.4 % rectal ointment Place rectally      ALPRAZolam (XANAX) 1 MG tablet Take 1 tablet by mouth nightly as needed.      nebivolol (BYSTOLIC) 10 MG tablet Take 1 tablet by mouth daily 90 tablet 0    lisinopril (PRINIVIL;ZESTRIL) 20 MG tablet Take 1 tablet by mouth daily 30 tablet 0    omeprazole (PRILOSEC) 40 MG delayed release capsule Take 1 capsule by mouth daily 90 capsule 1    amitriptyline (ELAVIL) 50 MG tablet Take 1 tablet by mouth nightly

## 2025-01-09 LAB
25(OH)D3+25(OH)D2 SERPL-MCNC: 27.9 NG/ML (ref 30–100)
ALBUMIN SERPL-MCNC: 4.2 G/DL (ref 3.8–4.9)
ALP SERPL-CCNC: 71 IU/L (ref 44–121)
ALT SERPL-CCNC: 28 IU/L (ref 0–44)
AST SERPL-CCNC: 21 IU/L (ref 0–40)
BASOPHILS # BLD AUTO: 0.1 X10E3/UL (ref 0–0.2)
BASOPHILS NFR BLD AUTO: 1 %
BILIRUB SERPL-MCNC: 0.4 MG/DL (ref 0–1.2)
BUN SERPL-MCNC: 9 MG/DL (ref 6–24)
BUN/CREAT SERPL: 8 (ref 9–20)
CALCIUM SERPL-MCNC: 9.3 MG/DL (ref 8.7–10.2)
CHLORIDE SERPL-SCNC: 100 MMOL/L (ref 96–106)
CHOLEST SERPL-MCNC: 178 MG/DL (ref 100–199)
CO2 SERPL-SCNC: 28 MMOL/L (ref 20–29)
CREAT SERPL-MCNC: 1.06 MG/DL (ref 0.76–1.27)
EGFRCR SERPLBLD CKD-EPI 2021: 81 ML/MIN/1.73
EOSINOPHIL # BLD AUTO: 0.1 X10E3/UL (ref 0–0.4)
EOSINOPHIL NFR BLD AUTO: 1 %
ERYTHROCYTE [DISTWIDTH] IN BLOOD BY AUTOMATED COUNT: 13.3 % (ref 11.6–15.4)
GLOBULIN SER CALC-MCNC: 2.2 G/DL (ref 1.5–4.5)
GLUCOSE SERPL-MCNC: 90 MG/DL (ref 70–99)
HBA1C MFR BLD: 5.8 % (ref 4.8–5.6)
HCT VFR BLD AUTO: 41.7 % (ref 37.5–51)
HCV IGG SERPL QL IA: NON REACTIVE
HDLC SERPL-MCNC: 35 MG/DL
HGB BLD-MCNC: 14.1 G/DL (ref 13–17.7)
HIV 1+2 AB+HIV1 P24 AG SERPL QL IA: NON REACTIVE
IMM GRANULOCYTES # BLD AUTO: 0 X10E3/UL (ref 0–0.1)
IMM GRANULOCYTES NFR BLD AUTO: 0 %
LDLC SERPL CALC-MCNC: 112 MG/DL (ref 0–99)
LYMPHOCYTES # BLD AUTO: 2.2 X10E3/UL (ref 0.7–3.1)
LYMPHOCYTES NFR BLD AUTO: 25 %
MCH RBC QN AUTO: 31.7 PG (ref 26.6–33)
MCHC RBC AUTO-ENTMCNC: 33.8 G/DL (ref 31.5–35.7)
MCV RBC AUTO: 94 FL (ref 79–97)
MONOCYTES # BLD AUTO: 0.5 X10E3/UL (ref 0.1–0.9)
MONOCYTES NFR BLD AUTO: 5 %
NEUTROPHILS # BLD AUTO: 5.9 X10E3/UL (ref 1.4–7)
NEUTROPHILS NFR BLD AUTO: 68 %
PLATELET # BLD AUTO: 224 X10E3/UL (ref 150–450)
POTASSIUM SERPL-SCNC: 4.5 MMOL/L (ref 3.5–5.2)
PROT SERPL-MCNC: 6.4 G/DL (ref 6–8.5)
RBC # BLD AUTO: 4.45 X10E6/UL (ref 4.14–5.8)
SODIUM SERPL-SCNC: 139 MMOL/L (ref 134–144)
TRIGL SERPL-MCNC: 173 MG/DL (ref 0–149)
TSH SERPL DL<=0.005 MIU/L-ACNC: 1.45 UIU/ML (ref 0.45–4.5)
VLDLC SERPL CALC-MCNC: 31 MG/DL (ref 5–40)
WBC # BLD AUTO: 8.7 X10E3/UL (ref 3.4–10.8)

## 2025-01-20 DIAGNOSIS — I10 ESSENTIAL (PRIMARY) HYPERTENSION: ICD-10-CM

## 2025-01-20 RX ORDER — LISINOPRIL 20 MG/1
20 TABLET ORAL DAILY
Qty: 90 TABLET | Refills: 1 | Status: SHIPPED | OUTPATIENT
Start: 2025-01-20

## 2025-02-18 DIAGNOSIS — Z86.711 HISTORY OF PULMONARY EMBOLISM: ICD-10-CM

## 2025-02-18 DIAGNOSIS — I10 ESSENTIAL (PRIMARY) HYPERTENSION: ICD-10-CM

## 2025-02-18 DIAGNOSIS — K21.9 GASTROESOPHAGEAL REFLUX DISEASE, UNSPECIFIED WHETHER ESOPHAGITIS PRESENT: ICD-10-CM

## 2025-02-18 RX ORDER — NEBIVOLOL 10 MG/1
10 TABLET ORAL DAILY
Qty: 90 TABLET | Refills: 1 | Status: SHIPPED | OUTPATIENT
Start: 2025-02-18

## 2025-02-18 RX ORDER — OMEPRAZOLE 40 MG/1
40 CAPSULE, DELAYED RELEASE ORAL DAILY
Qty: 90 CAPSULE | Refills: 1 | Status: SHIPPED | OUTPATIENT
Start: 2025-02-18

## 2025-06-13 ENCOUNTER — APPOINTMENT (OUTPATIENT)
Facility: HOSPITAL | Age: 60
End: 2025-06-13
Payer: COMMERCIAL

## 2025-06-13 ENCOUNTER — HOSPITAL ENCOUNTER (EMERGENCY)
Facility: HOSPITAL | Age: 60
Discharge: HOME | End: 2025-06-13
Attending: EMERGENCY MEDICINE
Payer: COMMERCIAL

## 2025-06-13 VITALS
DIASTOLIC BLOOD PRESSURE: 84 MMHG | HEIGHT: 71 IN | WEIGHT: 234 LBS | BODY MASS INDEX: 32.76 KG/M2 | SYSTOLIC BLOOD PRESSURE: 119 MMHG | HEART RATE: 71 BPM | OXYGEN SATURATION: 91 % | RESPIRATION RATE: 13 BRPM | TEMPERATURE: 98.6 F

## 2025-06-13 DIAGNOSIS — R06.02 SHORTNESS OF BREATH: Primary | ICD-10-CM

## 2025-06-13 LAB
ALBUMIN SERPL-MCNC: 3.7 G/DL (ref 3.5–5)
ALBUMIN/GLOB SERPL: 1.2 (ref 1.1–2.2)
ALP SERPL-CCNC: 73 U/L (ref 45–117)
ALT SERPL-CCNC: 36 U/L (ref 12–78)
ANION GAP SERPL CALC-SCNC: 4 MMOL/L (ref 2–12)
AST SERPL-CCNC: 24 U/L (ref 15–37)
BASOPHILS # BLD: 0.06 K/UL (ref 0–0.1)
BASOPHILS NFR BLD: 0.8 % (ref 0–1)
BILIRUB SERPL-MCNC: 0.9 MG/DL (ref 0.2–1)
BUN SERPL-MCNC: 8 MG/DL (ref 6–20)
BUN/CREAT SERPL: 7 (ref 12–20)
CALCIUM SERPL-MCNC: 8.6 MG/DL (ref 8.5–10.1)
CHLORIDE SERPL-SCNC: 103 MMOL/L (ref 97–108)
CO2 SERPL-SCNC: 29 MMOL/L (ref 21–32)
COMMENT:: NORMAL
CREAT SERPL-MCNC: 1.08 MG/DL (ref 0.7–1.3)
DIFFERENTIAL METHOD BLD: NORMAL
EOSINOPHIL # BLD: 0.04 K/UL (ref 0–0.4)
EOSINOPHIL NFR BLD: 0.5 % (ref 0–7)
ERYTHROCYTE [DISTWIDTH] IN BLOOD BY AUTOMATED COUNT: 13.8 % (ref 11.5–14.5)
GLOBULIN SER CALC-MCNC: 3.1 G/DL (ref 2–4)
GLUCOSE SERPL-MCNC: 113 MG/DL (ref 65–100)
HCT VFR BLD AUTO: 40.6 % (ref 36.6–50.3)
HGB BLD-MCNC: 14.2 G/DL (ref 12.1–17)
IMM GRANULOCYTES # BLD AUTO: 0.04 K/UL (ref 0–0.04)
IMM GRANULOCYTES NFR BLD AUTO: 0.5 % (ref 0–0.5)
LIPASE SERPL-CCNC: 69 U/L (ref 13–75)
LYMPHOCYTES # BLD: 1.88 K/UL (ref 0.8–3.5)
LYMPHOCYTES NFR BLD: 24 % (ref 12–49)
MAGNESIUM SERPL-MCNC: 2.1 MG/DL (ref 1.6–2.4)
MCH RBC QN AUTO: 32.1 PG (ref 26–34)
MCHC RBC AUTO-ENTMCNC: 35 G/DL (ref 30–36.5)
MCV RBC AUTO: 91.6 FL (ref 80–99)
MONOCYTES # BLD: 0.54 K/UL (ref 0–1)
MONOCYTES NFR BLD: 6.9 % (ref 5–13)
NEUTS SEG # BLD: 5.28 K/UL (ref 1.8–8)
NEUTS SEG NFR BLD: 67.3 % (ref 32–75)
NRBC # BLD: 0 K/UL (ref 0–0.01)
NRBC BLD-RTO: 0 PER 100 WBC
NT PRO BNP: 15 PG/ML
PLATELET # BLD AUTO: 214 K/UL (ref 150–400)
PMV BLD AUTO: 9.5 FL (ref 8.9–12.9)
POTASSIUM SERPL-SCNC: 3.9 MMOL/L (ref 3.5–5.1)
PROT SERPL-MCNC: 6.8 G/DL (ref 6.4–8.2)
RBC # BLD AUTO: 4.43 M/UL (ref 4.1–5.7)
SODIUM SERPL-SCNC: 136 MMOL/L (ref 136–145)
SPECIMEN HOLD: NORMAL
TROPONIN I SERPL HS-MCNC: 4 NG/L (ref 0–76)
TSH SERPL DL<=0.05 MIU/L-ACNC: 2.23 UIU/ML (ref 0.36–3.74)
WBC # BLD AUTO: 7.8 K/UL (ref 4.1–11.1)

## 2025-06-13 PROCEDURE — 80053 COMPREHEN METABOLIC PANEL: CPT

## 2025-06-13 PROCEDURE — 6370000000 HC RX 637 (ALT 250 FOR IP): Performed by: EMERGENCY MEDICINE

## 2025-06-13 PROCEDURE — 84484 ASSAY OF TROPONIN QUANT: CPT

## 2025-06-13 PROCEDURE — 83880 ASSAY OF NATRIURETIC PEPTIDE: CPT

## 2025-06-13 PROCEDURE — 71275 CT ANGIOGRAPHY CHEST: CPT

## 2025-06-13 PROCEDURE — 84443 ASSAY THYROID STIM HORMONE: CPT

## 2025-06-13 PROCEDURE — 93005 ELECTROCARDIOGRAM TRACING: CPT | Performed by: EMERGENCY MEDICINE

## 2025-06-13 PROCEDURE — 85025 COMPLETE CBC W/AUTO DIFF WBC: CPT

## 2025-06-13 PROCEDURE — 36415 COLL VENOUS BLD VENIPUNCTURE: CPT

## 2025-06-13 PROCEDURE — 83735 ASSAY OF MAGNESIUM: CPT

## 2025-06-13 PROCEDURE — 6360000002 HC RX W HCPCS: Performed by: EMERGENCY MEDICINE

## 2025-06-13 PROCEDURE — 2500000003 HC RX 250 WO HCPCS: Performed by: EMERGENCY MEDICINE

## 2025-06-13 PROCEDURE — 6360000004 HC RX CONTRAST MEDICATION: Performed by: EMERGENCY MEDICINE

## 2025-06-13 PROCEDURE — 96374 THER/PROPH/DIAG INJ IV PUSH: CPT

## 2025-06-13 PROCEDURE — 99285 EMERGENCY DEPT VISIT HI MDM: CPT

## 2025-06-13 PROCEDURE — 83690 ASSAY OF LIPASE: CPT

## 2025-06-13 RX ORDER — IOPAMIDOL 755 MG/ML
100 INJECTION, SOLUTION INTRAVASCULAR
Status: COMPLETED | OUTPATIENT
Start: 2025-06-13 | End: 2025-06-13

## 2025-06-13 RX ORDER — ALBUTEROL SULFATE 90 UG/1
2 INHALANT RESPIRATORY (INHALATION) 4 TIMES DAILY PRN
Qty: 18 G | Refills: 0 | Status: SHIPPED | OUTPATIENT
Start: 2025-06-13

## 2025-06-13 RX ORDER — PREDNISONE 20 MG/1
40 TABLET ORAL DAILY
Qty: 10 TABLET | Refills: 0 | Status: SHIPPED | OUTPATIENT
Start: 2025-06-13 | End: 2025-06-18

## 2025-06-13 RX ADMIN — IOPAMIDOL 54 ML: 755 INJECTION, SOLUTION INTRAVENOUS at 17:30

## 2025-06-13 RX ADMIN — WATER 125 MG: 1 INJECTION INTRAMUSCULAR; INTRAVENOUS; SUBCUTANEOUS at 19:27

## 2025-06-13 RX ADMIN — ALBUTEROL SULFATE 1 DOSE: 2.5 SOLUTION RESPIRATORY (INHALATION) at 19:27

## 2025-06-13 NOTE — ED PROVIDER NOTES
EMERGENCY DEPARTMENT PHYSICIAN NOTE     Patient: Ino Neff     Time of Service: 6/13/2025  4:10 PM     Chief complaint:   Chief Complaint   Patient presents with    Chest Pain    Shortness of Breath        HISTORY:  Patient is a 60 y.o. male who presents to the emergency department with complaints of  shortness of breath, chest pain.       Past Medical History:   Diagnosis Date    Anxiety disorder     Depression     Diarrhea     GERD (gastroesophageal reflux disease)     Hypertension     Neuropathy of foot     Panic disorder     Psychiatric disorder     panic disorder        Past Surgical History:   Procedure Laterality Date    CHOLECYSTECTOMY      ORTHOPEDIC SURGERY  2021    Hip replacement    OTHER SURGICAL HISTORY  2014    left arm surgery following accident    OTHER SURGICAL HISTORY      fatty tumor removed from shoulder blade    TUMOR REMOVAL      fatty tumor removed from Left shoulder x 2        Family History   Problem Relation Age of Onset    Heart Attack Father         Social History     Socioeconomic History    Marital status:    Tobacco Use    Smoking status: Never    Smokeless tobacco: Current   Vaping Use    Vaping status: Never Used   Substance and Sexual Activity    Alcohol use: Never     Alcohol/week: 0.0 standard drinks of alcohol    Drug use: No   Social History Narrative    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 He has been treated for the last 25 years on Xanax by his psychiatrist, Dr. GERMÁN Estrada. He has one prior inpatient admission at [facility], in the last year. Pt. Has been having breakthrough anxiety and \"panic salma    ** Merged History Encounter **    50 year old   male admitted for complaints of severely depressed mood, with suicidal thoughts. Pt. Lives with his wife of 12 years and works as a  for [employer]. Pt.has some college.  He has     cks\" despite Xanax doses greater than 3 mg q day and the addition of propanolol. Pt. Hs not been

## 2025-06-13 NOTE — ED NOTES
Bedside and Verbal shift change report given to Linda (oncoming nurse) by Soledad (offgoing nurse). Report included the following information ED Encounter Summary, ED SBAR, Adult Overview, MAR, and Recent Results.

## 2025-06-14 LAB
EKG ATRIAL RATE: 65 BPM
EKG DIAGNOSIS: NORMAL
EKG P AXIS: 47 DEGREES
EKG P-R INTERVAL: 192 MS
EKG Q-T INTERVAL: 430 MS
EKG QRS DURATION: 86 MS
EKG QTC CALCULATION (BAZETT): 447 MS
EKG R AXIS: 19 DEGREES
EKG T AXIS: 70 DEGREES
EKG VENTRICULAR RATE: 65 BPM

## 2025-06-14 PROCEDURE — 93010 ELECTROCARDIOGRAM REPORT: CPT | Performed by: INTERNAL MEDICINE

## 2025-07-12 DIAGNOSIS — I10 ESSENTIAL (PRIMARY) HYPERTENSION: ICD-10-CM

## 2025-07-15 RX ORDER — LISINOPRIL 20 MG/1
20 TABLET ORAL DAILY
Qty: 30 TABLET | Refills: 0 | Status: SHIPPED | OUTPATIENT
Start: 2025-07-15 | End: 2025-07-16 | Stop reason: SDUPTHER

## 2025-07-15 SDOH — ECONOMIC STABILITY: INCOME INSECURITY: IN THE LAST 12 MONTHS, WAS THERE A TIME WHEN YOU WERE NOT ABLE TO PAY THE MORTGAGE OR RENT ON TIME?: NO

## 2025-07-15 SDOH — ECONOMIC STABILITY: FOOD INSECURITY: WITHIN THE PAST 12 MONTHS, YOU WORRIED THAT YOUR FOOD WOULD RUN OUT BEFORE YOU GOT MONEY TO BUY MORE.: NEVER TRUE

## 2025-07-15 SDOH — ECONOMIC STABILITY: TRANSPORTATION INSECURITY
IN THE PAST 12 MONTHS, HAS THE LACK OF TRANSPORTATION KEPT YOU FROM MEDICAL APPOINTMENTS OR FROM GETTING MEDICATIONS?: NO

## 2025-07-15 SDOH — ECONOMIC STABILITY: FOOD INSECURITY: WITHIN THE PAST 12 MONTHS, THE FOOD YOU BOUGHT JUST DIDN'T LAST AND YOU DIDN'T HAVE MONEY TO GET MORE.: NEVER TRUE

## 2025-07-15 SDOH — ECONOMIC STABILITY: TRANSPORTATION INSECURITY
IN THE PAST 12 MONTHS, HAS LACK OF TRANSPORTATION KEPT YOU FROM MEETINGS, WORK, OR FROM GETTING THINGS NEEDED FOR DAILY LIVING?: NO

## 2025-07-16 ENCOUNTER — OFFICE VISIT (OUTPATIENT)
Facility: CLINIC | Age: 60
End: 2025-07-16
Payer: COMMERCIAL

## 2025-07-16 VITALS
WEIGHT: 244.4 LBS | SYSTOLIC BLOOD PRESSURE: 127 MMHG | TEMPERATURE: 98.3 F | HEART RATE: 70 BPM | BODY MASS INDEX: 34.22 KG/M2 | DIASTOLIC BLOOD PRESSURE: 72 MMHG | RESPIRATION RATE: 17 BRPM | HEIGHT: 71 IN | OXYGEN SATURATION: 97 %

## 2025-07-16 DIAGNOSIS — R06.02 EXERTIONAL SHORTNESS OF BREATH: ICD-10-CM

## 2025-07-16 DIAGNOSIS — Z86.711 HISTORY OF PULMONARY EMBOLISM: ICD-10-CM

## 2025-07-16 DIAGNOSIS — I10 ESSENTIAL (PRIMARY) HYPERTENSION: Primary | ICD-10-CM

## 2025-07-16 DIAGNOSIS — E78.5 HYPERLIPIDEMIA, UNSPECIFIED HYPERLIPIDEMIA TYPE: ICD-10-CM

## 2025-07-16 PROCEDURE — 3074F SYST BP LT 130 MM HG: CPT | Performed by: STUDENT IN AN ORGANIZED HEALTH CARE EDUCATION/TRAINING PROGRAM

## 2025-07-16 PROCEDURE — 3078F DIAST BP <80 MM HG: CPT | Performed by: STUDENT IN AN ORGANIZED HEALTH CARE EDUCATION/TRAINING PROGRAM

## 2025-07-16 PROCEDURE — 99214 OFFICE O/P EST MOD 30 MIN: CPT | Performed by: STUDENT IN AN ORGANIZED HEALTH CARE EDUCATION/TRAINING PROGRAM

## 2025-07-16 RX ORDER — LISINOPRIL 20 MG/1
20 TABLET ORAL DAILY
Qty: 90 TABLET | Refills: 1 | Status: SHIPPED | OUTPATIENT
Start: 2025-07-16

## 2025-07-16 RX ORDER — NEBIVOLOL 10 MG/1
10 TABLET ORAL DAILY
Qty: 90 TABLET | Refills: 1 | Status: SHIPPED | OUTPATIENT
Start: 2025-07-16

## 2025-07-16 SDOH — ECONOMIC STABILITY: FOOD INSECURITY: WITHIN THE PAST 12 MONTHS, YOU WORRIED THAT YOUR FOOD WOULD RUN OUT BEFORE YOU GOT MONEY TO BUY MORE.: NEVER TRUE

## 2025-07-16 SDOH — ECONOMIC STABILITY: FOOD INSECURITY: WITHIN THE PAST 12 MONTHS, THE FOOD YOU BOUGHT JUST DIDN'T LAST AND YOU DIDN'T HAVE MONEY TO GET MORE.: NEVER TRUE

## 2025-07-16 ASSESSMENT — PATIENT HEALTH QUESTIONNAIRE - PHQ9
SUM OF ALL RESPONSES TO PHQ QUESTIONS 1-9: 0
6. FEELING BAD ABOUT YOURSELF - OR THAT YOU ARE A FAILURE OR HAVE LET YOURSELF OR YOUR FAMILY DOWN: NOT AT ALL
SUM OF ALL RESPONSES TO PHQ QUESTIONS 1-9: 0
SUM OF ALL RESPONSES TO PHQ QUESTIONS 1-9: 0
8. MOVING OR SPEAKING SO SLOWLY THAT OTHER PEOPLE COULD HAVE NOTICED. OR THE OPPOSITE, BEING SO FIGETY OR RESTLESS THAT YOU HAVE BEEN MOVING AROUND A LOT MORE THAN USUAL: NOT AT ALL
5. POOR APPETITE OR OVEREATING: NOT AT ALL
4. FEELING TIRED OR HAVING LITTLE ENERGY: NOT AT ALL
1. LITTLE INTEREST OR PLEASURE IN DOING THINGS: NOT AT ALL
SUM OF ALL RESPONSES TO PHQ QUESTIONS 1-9: 0
3. TROUBLE FALLING OR STAYING ASLEEP: NOT AT ALL
9. THOUGHTS THAT YOU WOULD BE BETTER OFF DEAD, OR OF HURTING YOURSELF: NOT AT ALL
10. IF YOU CHECKED OFF ANY PROBLEMS, HOW DIFFICULT HAVE THESE PROBLEMS MADE IT FOR YOU TO DO YOUR WORK, TAKE CARE OF THINGS AT HOME, OR GET ALONG WITH OTHER PEOPLE: NOT DIFFICULT AT ALL
7. TROUBLE CONCENTRATING ON THINGS, SUCH AS READING THE NEWSPAPER OR WATCHING TELEVISION: NOT AT ALL
2. FEELING DOWN, DEPRESSED OR HOPELESS: NOT AT ALL

## 2025-07-16 ASSESSMENT — ENCOUNTER SYMPTOMS
ABDOMINAL PAIN: 0
NAUSEA: 0
COUGH: 0
VOMITING: 0
RHINORRHEA: 0
SHORTNESS OF BREATH: 0

## 2025-07-16 NOTE — PROGRESS NOTES
dentified pt with two pt identifiers(name and ).    Chief Complaint   Patient presents with    Medication Refill     Patient here for refills Lisinopril, Bystolic        Health Maintenance Due   Topic    Hepatitis B vaccine (1 of 3 - 19+ 3-dose series)    DTaP/Tdap/Td vaccine (1 - Tdap)    Pneumococcal 50+ years Vaccine (1 of 2 - PCV)    Shingles vaccine (1 of 2)    COVID-19 Vaccine (1 - - season)       Wt Readings from Last 3 Encounters:   25 106.1 kg (234 lb)   24 106.1 kg (234 lb)   24 107.7 kg (237 lb 6.4 oz)     Temp Readings from Last 3 Encounters:   25 98.6 °F (37 °C) (Oral)   24 97.5 °F (36.4 °C) (Temporal)   24 97.4 °F (36.3 °C) (Oral)     BP Readings from Last 3 Encounters:   25 119/84   24 116/73   24 126/78     Pulse Readings from Last 3 Encounters:   25 71   24 71   24 63           Coordination of Care Questionnaire:  :   1. \"Have you been to the ER, urgent care clinic since your last visit?  Hospitalized since your last visit?\" no    2. \"Have you seen or consulted any other health care providers outside of the [facility] since your last visit?\" no     3. For patients aged 45-75: Has the patient had a colonoscopy / FIT/ Cologuard? no      If the patient is female:    4. For patients aged 40-74: Has the patient had a mammogram within the past 2 years? no      5. For patients aged 21-65: Has the patient had a pap smear? no     3) Do you have an Advance Directive on file? no  Are you interested in receiving information about Advance Directives? no    Patient is accompanied by self I have received verbal consent from Ino Neff to discuss any/all medical information while they are present in the room.   
Rash     Past Medical History:   Diagnosis Date    Anxiety disorder     Depression     Diarrhea     GERD (gastroesophageal reflux disease)     Hypertension     Neuropathy of foot     Panic disorder     Psychiatric disorder     panic disorder      Past Surgical History:   Procedure Laterality Date    CHOLECYSTECTOMY      COLONOSCOPY  October 2024    JOINT REPLACEMENT  April 2021    ORTHOPEDIC SURGERY  2021    Hip replacement    OTHER SURGICAL HISTORY  2014    left arm surgery following accident    OTHER SURGICAL HISTORY      fatty tumor removed from shoulder blade    TUMOR REMOVAL      fatty tumor removed from Left shoulder x 2      Family History   Problem Relation Age of Onset    Heart Attack Father       Social History     Socioeconomic History    Marital status:      Spouse name: Not on file    Number of children: Not on file    Years of education: Not on file    Highest education level: Not on file   Occupational History    Not on file   Tobacco Use    Smoking status: Never    Smokeless tobacco: Current   Vaping Use    Vaping status: Never Used   Substance and Sexual Activity    Alcohol use: Never    Drug use: Never    Sexual activity: Yes     Partners: Female   Other Topics Concern    Not on file   Social History Narrative    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 He has been treated for the last 25 years on Xanax by his psychiatrist, Dr. GERMÁN Estrada. He has one prior inpatient admission at [facility], in the last year. Pt. Has been having breakthrough anxiety and \"panic salma    ** Merged History Encounter **    50 year old   male admitted for complaints of severely depressed mood, with suicidal thoughts. Pt. Lives with his wife of 12 years and works as a  for [employer]. Pt.has some college.  He has     cks\" despite Xanax doses greater than 3 mg q day and the addition of propanolol. Pt. Hs not been treated with an SSRI.         Social Drivers of Health     Financial Resource

## 2025-08-15 DIAGNOSIS — K21.9 GASTROESOPHAGEAL REFLUX DISEASE, UNSPECIFIED WHETHER ESOPHAGITIS PRESENT: ICD-10-CM

## 2025-08-15 RX ORDER — OMEPRAZOLE 40 MG/1
CAPSULE, DELAYED RELEASE ORAL DAILY
Qty: 90 CAPSULE | Refills: 1 | Status: SHIPPED | OUTPATIENT
Start: 2025-08-15